# Patient Record
Sex: FEMALE | Race: OTHER | HISPANIC OR LATINO | URBAN - METROPOLITAN AREA
[De-identification: names, ages, dates, MRNs, and addresses within clinical notes are randomized per-mention and may not be internally consistent; named-entity substitution may affect disease eponyms.]

---

## 2017-01-05 ENCOUNTER — OUTPATIENT (OUTPATIENT)
Dept: OUTPATIENT SERVICES | Facility: HOSPITAL | Age: 27
LOS: 1 days | End: 2017-01-05
Payer: COMMERCIAL

## 2017-01-05 VITALS
SYSTOLIC BLOOD PRESSURE: 110 MMHG | TEMPERATURE: 98 F | DIASTOLIC BLOOD PRESSURE: 72 MMHG | HEIGHT: 64 IN | RESPIRATION RATE: 20 BRPM | HEART RATE: 68 BPM | WEIGHT: 127.87 LBS

## 2017-01-05 DIAGNOSIS — N87.9 DYSPLASIA OF CERVIX UTERI, UNSPECIFIED: ICD-10-CM

## 2017-01-05 DIAGNOSIS — Z01.818 ENCOUNTER FOR OTHER PREPROCEDURAL EXAMINATION: ICD-10-CM

## 2017-01-05 DIAGNOSIS — Z98.891 HISTORY OF UTERINE SCAR FROM PREVIOUS SURGERY: Chronic | ICD-10-CM

## 2017-01-05 DIAGNOSIS — I10 ESSENTIAL (PRIMARY) HYPERTENSION: ICD-10-CM

## 2017-01-05 LAB
ALBUMIN SERPL ELPH-MCNC: 4.4 G/DL — SIGNIFICANT CHANGE UP (ref 3.3–5.2)
ALP SERPL-CCNC: 88 U/L — SIGNIFICANT CHANGE UP (ref 40–120)
ALT FLD-CCNC: 18 U/L — SIGNIFICANT CHANGE UP
ANION GAP SERPL CALC-SCNC: 12 MMOL/L — SIGNIFICANT CHANGE UP (ref 5–17)
AST SERPL-CCNC: 24 U/L — SIGNIFICANT CHANGE UP
BILIRUB SERPL-MCNC: 0.5 MG/DL — SIGNIFICANT CHANGE UP (ref 0.4–2)
BUN SERPL-MCNC: 9 MG/DL — SIGNIFICANT CHANGE UP (ref 8–20)
CALCIUM SERPL-MCNC: 9.6 MG/DL — SIGNIFICANT CHANGE UP (ref 8.6–10.2)
CHLORIDE SERPL-SCNC: 101 MMOL/L — SIGNIFICANT CHANGE UP (ref 98–107)
CO2 SERPL-SCNC: 26 MMOL/L — SIGNIFICANT CHANGE UP (ref 22–29)
CREAT SERPL-MCNC: 0.53 MG/DL — SIGNIFICANT CHANGE UP (ref 0.5–1.3)
GLUCOSE SERPL-MCNC: 85 MG/DL — SIGNIFICANT CHANGE UP (ref 70–115)
HCT VFR BLD CALC: 41.8 % — SIGNIFICANT CHANGE UP (ref 37–47)
HGB BLD-MCNC: 14.2 G/DL — SIGNIFICANT CHANGE UP (ref 12–16)
MCHC RBC-ENTMCNC: 30.9 PG — SIGNIFICANT CHANGE UP (ref 27–31)
MCHC RBC-ENTMCNC: 34 G/DL — SIGNIFICANT CHANGE UP (ref 32–36)
MCV RBC AUTO: 90.9 FL — SIGNIFICANT CHANGE UP (ref 81–99)
PLATELET # BLD AUTO: 323 K/UL — SIGNIFICANT CHANGE UP (ref 150–400)
POTASSIUM SERPL-MCNC: 3.7 MMOL/L — SIGNIFICANT CHANGE UP (ref 3.5–5.3)
POTASSIUM SERPL-SCNC: 3.7 MMOL/L — SIGNIFICANT CHANGE UP (ref 3.5–5.3)
PROT SERPL-MCNC: 7.6 G/DL — SIGNIFICANT CHANGE UP (ref 6.6–8.7)
RBC # BLD: 4.6 M/UL — SIGNIFICANT CHANGE UP (ref 4.4–5.2)
RBC # FLD: 12.1 % — SIGNIFICANT CHANGE UP (ref 11–15.6)
SODIUM SERPL-SCNC: 139 MMOL/L — SIGNIFICANT CHANGE UP (ref 135–145)
WBC # BLD: 8.25 K/UL — SIGNIFICANT CHANGE UP (ref 4.8–10.8)
WBC # FLD AUTO: 8.25 K/UL — SIGNIFICANT CHANGE UP (ref 4.8–10.8)

## 2017-01-05 PROCEDURE — 93005 ELECTROCARDIOGRAM TRACING: CPT

## 2017-01-05 PROCEDURE — 80053 COMPREHEN METABOLIC PANEL: CPT

## 2017-01-05 PROCEDURE — T1013: CPT

## 2017-01-05 PROCEDURE — 85027 COMPLETE CBC AUTOMATED: CPT

## 2017-01-05 PROCEDURE — G0463: CPT

## 2017-01-05 PROCEDURE — 93010 ELECTROCARDIOGRAM REPORT: CPT

## 2017-01-05 NOTE — H&P PST ADULT - HISTORY OF PRESENT ILLNESS
This is a 26 y.o Slovak speaking female who presents to PST today.  The pt reports she had a routine PAP smear which demonstrated abnormal cells.  A subsequent biopsy demonstrated cancerous cells.  She is scheduled for LEEP procedure in the near future.

## 2017-01-05 NOTE — H&P PST ADULT - FAMILY HISTORY
Father  Still living? Yes, Estimated age: 41-50  Family history of diabetes mellitus, Age at diagnosis: Age Unknown  Family history of renal failure, Age at diagnosis: Age Unknown

## 2017-01-05 NOTE — H&P PST ADULT - NSANTHOSAYNRD_GEN_A_CORE
No. GISSELLE screening performed.  STOP BANG Legend: 0-2 = LOW Risk; 3-4 = INTERMEDIATE Risk; 5-8 = HIGH Risk

## 2017-01-06 DIAGNOSIS — Z01.818 ENCOUNTER FOR OTHER PREPROCEDURAL EXAMINATION: ICD-10-CM

## 2017-01-06 DIAGNOSIS — N87.9 DYSPLASIA OF CERVIX UTERI, UNSPECIFIED: ICD-10-CM

## 2017-01-19 ENCOUNTER — OUTPATIENT (OUTPATIENT)
Dept: OUTPATIENT SERVICES | Facility: HOSPITAL | Age: 27
LOS: 1 days | Discharge: HOME | End: 2017-01-19
Payer: COMMERCIAL

## 2017-01-19 ENCOUNTER — RESULT REVIEW (OUTPATIENT)
Age: 27
End: 2017-01-19

## 2017-01-19 VITALS
DIASTOLIC BLOOD PRESSURE: 78 MMHG | SYSTOLIC BLOOD PRESSURE: 120 MMHG | HEIGHT: 63.5 IN | OXYGEN SATURATION: 100 % | WEIGHT: 128.97 LBS | TEMPERATURE: 98 F | RESPIRATION RATE: 20 BRPM | HEART RATE: 76 BPM

## 2017-01-19 VITALS
SYSTOLIC BLOOD PRESSURE: 102 MMHG | DIASTOLIC BLOOD PRESSURE: 62 MMHG | OXYGEN SATURATION: 100 % | RESPIRATION RATE: 16 BRPM | TEMPERATURE: 99 F | HEART RATE: 60 BPM

## 2017-01-19 DIAGNOSIS — Z98.891 HISTORY OF UTERINE SCAR FROM PREVIOUS SURGERY: Chronic | ICD-10-CM

## 2017-01-19 DIAGNOSIS — N87.9 DYSPLASIA OF CERVIX UTERI, UNSPECIFIED: ICD-10-CM

## 2017-01-19 RX ORDER — CEFAZOLIN SODIUM 1 G
1000 VIAL (EA) INJECTION ONCE
Qty: 0 | Refills: 0 | Status: DISCONTINUED | OUTPATIENT
Start: 2017-01-19 | End: 2017-01-19

## 2017-01-19 RX ORDER — CEFAZOLIN SODIUM 1 G
2000 VIAL (EA) INJECTION ONCE
Qty: 0 | Refills: 0 | Status: DISCONTINUED | OUTPATIENT
Start: 2017-01-19 | End: 2017-01-19

## 2017-01-19 RX ORDER — ONDANSETRON 8 MG/1
4 TABLET, FILM COATED ORAL ONCE
Qty: 0 | Refills: 0 | Status: DISCONTINUED | OUTPATIENT
Start: 2017-01-19 | End: 2017-01-19

## 2017-01-19 RX ORDER — LABETALOL HCL 100 MG
1 TABLET ORAL
Qty: 0 | Refills: 0 | COMMUNITY

## 2017-01-19 RX ORDER — SODIUM CHLORIDE 9 MG/ML
3 INJECTION INTRAMUSCULAR; INTRAVENOUS; SUBCUTANEOUS ONCE
Qty: 0 | Refills: 0 | Status: DISCONTINUED | OUTPATIENT
Start: 2017-01-19 | End: 2017-01-19

## 2017-01-19 RX ORDER — SODIUM CHLORIDE 9 MG/ML
1000 INJECTION, SOLUTION INTRAVENOUS
Qty: 0 | Refills: 0 | Status: DISCONTINUED | OUTPATIENT
Start: 2017-01-19 | End: 2017-01-19

## 2017-01-19 RX ORDER — HYDROMORPHONE HYDROCHLORIDE 2 MG/ML
0.5 INJECTION INTRAMUSCULAR; INTRAVENOUS; SUBCUTANEOUS
Qty: 0 | Refills: 0 | Status: DISCONTINUED | OUTPATIENT
Start: 2017-01-19 | End: 2017-01-19

## 2017-01-20 ENCOUNTER — TRANSCRIPTION ENCOUNTER (OUTPATIENT)
Age: 27
End: 2017-01-20

## 2017-01-20 PROCEDURE — 88307 TISSUE EXAM BY PATHOLOGIST: CPT | Mod: 26

## 2017-01-20 PROCEDURE — 88307 TISSUE EXAM BY PATHOLOGIST: CPT

## 2017-01-20 PROCEDURE — 57522 CONIZATION OF CERVIX: CPT

## 2017-01-20 PROCEDURE — T1013: CPT

## 2017-01-23 LAB — SURGICAL PATHOLOGY FINAL REPORT - CH: SIGNIFICANT CHANGE UP

## 2017-02-02 NOTE — ASU PATIENT PROFILE, ADULT - NSALCOHOLPROBLEMSRELYN_GEN_A_CORE_SD
Spoke with nurse at [address] who stated patient was refusing to take her Vimpat. Nurse stated she already spoke with nursing home attending physician.  Informed nurse that patient has only been seen in the hospital.    Attempted to transfer c
no

## 2018-01-31 ENCOUNTER — EMERGENCY (EMERGENCY)
Facility: HOSPITAL | Age: 28
LOS: 1 days | Discharge: HOME | End: 2018-01-31
Attending: EMERGENCY MEDICINE
Payer: COMMERCIAL

## 2018-01-31 VITALS — SYSTOLIC BLOOD PRESSURE: 186 MMHG | HEART RATE: 68 BPM | OXYGEN SATURATION: 100 % | DIASTOLIC BLOOD PRESSURE: 119 MMHG

## 2018-01-31 VITALS
HEART RATE: 77 BPM | TEMPERATURE: 99 F | OXYGEN SATURATION: 99 % | DIASTOLIC BLOOD PRESSURE: 129 MMHG | WEIGHT: 128.09 LBS | SYSTOLIC BLOOD PRESSURE: 180 MMHG | HEIGHT: 64 IN | RESPIRATION RATE: 16 BRPM

## 2018-01-31 DIAGNOSIS — Z98.891 HISTORY OF UTERINE SCAR FROM PREVIOUS SURGERY: Chronic | ICD-10-CM

## 2018-01-31 PROBLEM — I10 ESSENTIAL (PRIMARY) HYPERTENSION: Chronic | Status: ACTIVE | Noted: 2017-01-05

## 2018-01-31 PROCEDURE — 99282 EMERGENCY DEPT VISIT SF MDM: CPT

## 2018-01-31 PROCEDURE — 99284 EMERGENCY DEPT VISIT MOD MDM: CPT

## 2018-01-31 RX ORDER — LABETALOL HCL 100 MG
1 TABLET ORAL
Qty: 60 | Refills: 0 | OUTPATIENT
Start: 2018-01-31 | End: 2018-03-01

## 2018-01-31 RX ORDER — LOSARTAN/HYDROCHLOROTHIAZIDE 100MG-25MG
1 TABLET ORAL
Qty: 30 | Refills: 0 | OUTPATIENT
Start: 2018-01-31 | End: 2018-03-01

## 2018-01-31 RX ORDER — AMLODIPINE BESYLATE 2.5 MG/1
1 TABLET ORAL
Qty: 30 | Refills: 0 | OUTPATIENT
Start: 2018-01-31 | End: 2018-03-01

## 2018-01-31 NOTE — ED PROVIDER NOTE - TEMPLATE
Peripheral Block    Patient location during procedure: holding area  Start time: 10/27/2017 11:00 AM  Reason for block: post-op pain management  Staffing  Anesthesiologist: GOLD TONG  Preanesthetic Checklist  Completed: patient identified, site marked, pre-op evaluation, timeout performed, IV checked, risks and benefits discussed and monitors and equipment checked  Peripheral Block  Patient position: supine  Prep: ChloraPrep  Patient monitoring: cardiac monitor and continuous pulse ox  Block type: adductor canal block  Laterality: right  Injection technique: single-shot  Procedures: ultrasound guided  Local infiltration: ropivacaine  Infiltration strength: 0 5 %  Dose: 15 mL  Needle  Needle type: [facility]   Needle gauge: 22 G  Needle length: 10 cm  Needle localization: ultrasound guidance  Needle insertion depth: 5 cm  Test dose: negative  Assessment  Injection assessment: incremental injection, local visualized surrounding nerve on ultrasound and negative aspiration for heme  Paresthesia pain: none  Heart rate change: no  Slow fractionated injection: yes  Post-procedure:  site cleaned
Peripheral Block    Patient location during procedure: holding area  Start time: 10/27/2017 11:10 AM  Reason for block: post-op pain management  Staffing  Anesthesiologist: Ramses Felipe  Performed: anesthesiologist   Preanesthetic Checklist  Completed: patient identified, site marked, surgical consent, pre-op evaluation, timeout performed, IV checked, risks and benefits discussed and monitors and equipment checked  Peripheral Block  Patient position: supine  Prep: ChloraPrep  Patient monitoring: continuous pulse ox and frequent blood pressure checks  Block type: popliteal  Laterality: right  Injection technique: single-shot  Procedures: ultrasound guided  Local infiltration: ropivacaine  Infiltration strength: 0 5 %  Dose: 15 mL  Needle  Needle type: Stimuplex   Needle gauge: 22 G  Needle length: 10 cm  Needle localization: ultrasound guidance  Needle insertion depth: 6 cm  Test dose: negative  Assessment  Injection assessment: incremental injection  Heart rate change: no  Slow fractionated injection: yes  Post-procedure:  site cleaned  patient tolerated the procedure well with no immediate complications
General

## 2018-01-31 NOTE — ED ADULT TRIAGE NOTE - CHIEF COMPLAINT QUOTE
pt was seen at [facility] for urinary burning, states has hx of high blood pressure and hasn't been taking it, was sent here for evaluation, pt is asymptomatic

## 2018-01-31 NOTE — ED PROVIDER NOTE - OBJECTIVE STATEMENT
This is a 27 year old female with c/o elevated BP, she reports is chronically high and takes three different medications to control her BP, however she ran out of medication x 1 month.  She reports went to PCP [facility] and was referred to ED because BP was elevated, and was given three different medications.  Patient is unaware of medications given.  She denies any headache or chest pain, SOB, abdominal pain, n/v/d.  Patient reports visit prompted from PCP secondary to dysuria.  Patient received Keflex for UTI coverage.

## 2018-01-31 NOTE — ED PROVIDER NOTE - ATTENDING CONTRIBUTION TO CARE
Seen with acp  here for asymptomatic hypertension  PE is urenarkable  will renew patient's  bp meds  Agree with ACPs assessment, hx and physical

## 2018-06-12 ENCOUNTER — APPOINTMENT (OUTPATIENT)
Dept: ORTHOPEDIC SURGERY | Facility: CLINIC | Age: 28
End: 2018-06-12
Payer: COMMERCIAL

## 2018-06-12 VITALS
WEIGHT: 128 LBS | HEIGHT: 65 IN | HEART RATE: 61 BPM | BODY MASS INDEX: 21.33 KG/M2 | SYSTOLIC BLOOD PRESSURE: 153 MMHG | DIASTOLIC BLOOD PRESSURE: 98 MMHG

## 2018-06-12 DIAGNOSIS — M51.36 OTHER INTERVERTEBRAL DISC DEGENERATION, LUMBAR REGION: ICD-10-CM

## 2018-06-12 DIAGNOSIS — M53.86 OTHER SPECIFIED DORSOPATHIES, LUMBAR REGION: ICD-10-CM

## 2018-06-12 PROCEDURE — 72100 X-RAY EXAM L-S SPINE 2/3 VWS: CPT

## 2018-06-12 PROCEDURE — 99204 OFFICE O/P NEW MOD 45 MIN: CPT

## 2018-09-07 ENCOUNTER — MESSAGE (OUTPATIENT)
Age: 28
End: 2018-09-07

## 2021-08-23 ENCOUNTER — EMERGENCY (EMERGENCY)
Facility: HOSPITAL | Age: 31
LOS: 1 days | Discharge: HOME | End: 2021-08-23
Attending: EMERGENCY MEDICINE
Payer: COMMERCIAL

## 2021-08-23 VITALS
HEIGHT: 64 IN | RESPIRATION RATE: 18 BRPM | SYSTOLIC BLOOD PRESSURE: 220 MMHG | HEART RATE: 64 BPM | TEMPERATURE: 98 F | DIASTOLIC BLOOD PRESSURE: 140 MMHG | OXYGEN SATURATION: 100 %

## 2021-08-23 VITALS
SYSTOLIC BLOOD PRESSURE: 178 MMHG | OXYGEN SATURATION: 98 % | RESPIRATION RATE: 18 BRPM | HEART RATE: 82 BPM | DIASTOLIC BLOOD PRESSURE: 87 MMHG

## 2021-08-23 DIAGNOSIS — Z98.891 HISTORY OF UTERINE SCAR FROM PREVIOUS SURGERY: Chronic | ICD-10-CM

## 2021-08-23 LAB
ALBUMIN SERPL ELPH-MCNC: 4.1 G/DL — SIGNIFICANT CHANGE UP (ref 3.3–5.2)
ALP SERPL-CCNC: 80 U/L — SIGNIFICANT CHANGE UP (ref 40–120)
ALT FLD-CCNC: 24 U/L — SIGNIFICANT CHANGE UP
ANION GAP SERPL CALC-SCNC: 13 MMOL/L — SIGNIFICANT CHANGE UP (ref 5–17)
AST SERPL-CCNC: 23 U/L — SIGNIFICANT CHANGE UP
BASOPHILS # BLD AUTO: 0.08 K/UL — SIGNIFICANT CHANGE UP (ref 0–0.2)
BASOPHILS NFR BLD AUTO: 1.3 % — SIGNIFICANT CHANGE UP (ref 0–2)
BILIRUB SERPL-MCNC: 0.2 MG/DL — LOW (ref 0.4–2)
BUN SERPL-MCNC: 11.3 MG/DL — SIGNIFICANT CHANGE UP (ref 8–20)
CALCIUM SERPL-MCNC: 9.3 MG/DL — SIGNIFICANT CHANGE UP (ref 8.6–10.2)
CHLORIDE SERPL-SCNC: 102 MMOL/L — SIGNIFICANT CHANGE UP (ref 98–107)
CO2 SERPL-SCNC: 24 MMOL/L — SIGNIFICANT CHANGE UP (ref 22–29)
CREAT SERPL-MCNC: 0.65 MG/DL — SIGNIFICANT CHANGE UP (ref 0.5–1.3)
EOSINOPHIL # BLD AUTO: 0.22 K/UL — SIGNIFICANT CHANGE UP (ref 0–0.5)
EOSINOPHIL NFR BLD AUTO: 3.5 % — SIGNIFICANT CHANGE UP (ref 0–6)
GLUCOSE SERPL-MCNC: 81 MG/DL — SIGNIFICANT CHANGE UP (ref 70–99)
HCT VFR BLD CALC: 44.2 % — SIGNIFICANT CHANGE UP (ref 34.5–45)
HGB BLD-MCNC: 14.5 G/DL — SIGNIFICANT CHANGE UP (ref 11.5–15.5)
IMM GRANULOCYTES NFR BLD AUTO: 0.3 % — SIGNIFICANT CHANGE UP (ref 0–1.5)
LYMPHOCYTES # BLD AUTO: 2.21 K/UL — SIGNIFICANT CHANGE UP (ref 1–3.3)
LYMPHOCYTES # BLD AUTO: 35 % — SIGNIFICANT CHANGE UP (ref 13–44)
MAGNESIUM SERPL-MCNC: 2.1 MG/DL — SIGNIFICANT CHANGE UP (ref 1.6–2.6)
MCHC RBC-ENTMCNC: 30.2 PG — SIGNIFICANT CHANGE UP (ref 27–34)
MCHC RBC-ENTMCNC: 32.8 GM/DL — SIGNIFICANT CHANGE UP (ref 32–36)
MCV RBC AUTO: 92.1 FL — SIGNIFICANT CHANGE UP (ref 80–100)
MONOCYTES # BLD AUTO: 0.4 K/UL — SIGNIFICANT CHANGE UP (ref 0–0.9)
MONOCYTES NFR BLD AUTO: 6.3 % — SIGNIFICANT CHANGE UP (ref 2–14)
NEUTROPHILS # BLD AUTO: 3.39 K/UL — SIGNIFICANT CHANGE UP (ref 1.8–7.4)
NEUTROPHILS NFR BLD AUTO: 53.6 % — SIGNIFICANT CHANGE UP (ref 43–77)
NT-PROBNP SERPL-SCNC: 121 PG/ML — SIGNIFICANT CHANGE UP (ref 0–300)
PLATELET # BLD AUTO: 317 K/UL — SIGNIFICANT CHANGE UP (ref 150–400)
POTASSIUM SERPL-MCNC: 3.5 MMOL/L — SIGNIFICANT CHANGE UP (ref 3.5–5.3)
POTASSIUM SERPL-SCNC: 3.5 MMOL/L — SIGNIFICANT CHANGE UP (ref 3.5–5.3)
PROT SERPL-MCNC: 7.5 G/DL — SIGNIFICANT CHANGE UP (ref 6.6–8.7)
RBC # BLD: 4.8 M/UL — SIGNIFICANT CHANGE UP (ref 3.8–5.2)
RBC # FLD: 12.8 % — SIGNIFICANT CHANGE UP (ref 10.3–14.5)
SARS-COV-2 RNA SPEC QL NAA+PROBE: SIGNIFICANT CHANGE UP
SODIUM SERPL-SCNC: 139 MMOL/L — SIGNIFICANT CHANGE UP (ref 135–145)
TROPONIN T SERPL-MCNC: <0.01 NG/ML — SIGNIFICANT CHANGE UP (ref 0–0.06)
WBC # BLD: 6.32 K/UL — SIGNIFICANT CHANGE UP (ref 3.8–10.5)
WBC # FLD AUTO: 6.32 K/UL — SIGNIFICANT CHANGE UP (ref 3.8–10.5)

## 2021-08-23 PROCEDURE — 93010 ELECTROCARDIOGRAM REPORT: CPT

## 2021-08-23 PROCEDURE — 71045 X-RAY EXAM CHEST 1 VIEW: CPT

## 2021-08-23 PROCEDURE — 99284 EMERGENCY DEPT VISIT MOD MDM: CPT | Mod: 25

## 2021-08-23 PROCEDURE — 71045 X-RAY EXAM CHEST 1 VIEW: CPT | Mod: 26

## 2021-08-23 PROCEDURE — U0003: CPT

## 2021-08-23 PROCEDURE — T1013: CPT

## 2021-08-23 PROCEDURE — 93005 ELECTROCARDIOGRAM TRACING: CPT

## 2021-08-23 PROCEDURE — 83880 ASSAY OF NATRIURETIC PEPTIDE: CPT

## 2021-08-23 PROCEDURE — 84702 CHORIONIC GONADOTROPIN TEST: CPT

## 2021-08-23 PROCEDURE — 85025 COMPLETE CBC W/AUTO DIFF WBC: CPT

## 2021-08-23 PROCEDURE — U0005: CPT

## 2021-08-23 PROCEDURE — 99285 EMERGENCY DEPT VISIT HI MDM: CPT

## 2021-08-23 PROCEDURE — 83735 ASSAY OF MAGNESIUM: CPT

## 2021-08-23 PROCEDURE — 84484 ASSAY OF TROPONIN QUANT: CPT

## 2021-08-23 PROCEDURE — 80053 COMPREHEN METABOLIC PANEL: CPT

## 2021-08-23 PROCEDURE — 36415 COLL VENOUS BLD VENIPUNCTURE: CPT

## 2021-08-23 RX ORDER — AMLODIPINE BESYLATE 2.5 MG/1
1 TABLET ORAL
Qty: 21 | Refills: 0
Start: 2021-08-23 | End: 2021-09-12

## 2021-08-23 RX ORDER — AMLODIPINE BESYLATE 2.5 MG/1
10 TABLET ORAL ONCE
Refills: 0 | Status: COMPLETED | OUTPATIENT
Start: 2021-08-23 | End: 2021-08-23

## 2021-08-23 RX ADMIN — AMLODIPINE BESYLATE 10 MILLIGRAM(S): 2.5 TABLET ORAL at 14:45

## 2021-08-23 NOTE — ED PROVIDER NOTE - ATTENDING CONTRIBUTION TO CARE
The patient seen and examined    Asymptomatic HTN    I, Ernie Penn, performed the initial face to face bedside interview with this patient regarding history of present illness, review of symptoms and relevant past medical, social and family history.  I completed an independent physical examination.  I was the initial provider who evaluated this patient. I have signed out the follow up of any pending tests (i.e. labs, radiological studies) to the resident.  I have communicated the patient’s plan of care and disposition with the resident

## 2021-08-23 NOTE — ED ADULT NURSE NOTE - SUICIDE SCREENING QUESTION 3
----- Message from Paris Jean MD sent at 1/3/2019  8:28 AM CST -----  Please advise pt that I have reviewed u/s however to follow up still with gyn as scheduled to discuss options and endometrial biopsy. Her u/s did show a simple cyst of left ovary and a benign appearing tumor (fibroid) which could also be an explanation for her irregular bleeding. Typically these are not concerning if they are small and pt is asymptomatic. Further recommendations per gyn. Appreciate their input.   No

## 2021-08-23 NOTE — ED PROVIDER NOTE - CARE PROVIDER_API CALL
Ewelina Funes)  Family Medicine  [address] [place] [address]  Phone: [phone]  Fax: [phone]  Follow Up Time:

## 2021-08-23 NOTE — ED ADULT NURSE NOTE - OBJECTIVE STATEMENT
Received pt. a&ox3 RR even and unlabored. Denies any pain, dizziness or blurred vision. Pt. medicated for for bp. Pt educated on plan of care, pt able to successfully teach back plan of care to RN, RN will continue to reeducate pt during hospital stay.

## 2021-08-23 NOTE — ED ADULT NURSE NOTE - CHIEF COMPLAINT QUOTE
Pt comes from doctors office, c/o high blood pressure, hx of HTN, has not been on medication since before covid, also c/o fever and cough since Friday

## 2021-08-23 NOTE — ED PROVIDER NOTE - PHYSICAL EXAMINATION
Constitutional: Awake, alert, in no acute distress  Eyes: no scleral icterus, PERRL  HENT: normocephalic, atraumatic, moist oral mucosa  Neck: supple  CV: RRR, no murmur  Pulm: non-labored respirations, CTAB  Abdomen: soft, non-tender, non-distended  Extremities: no edema, no deformity  Skin: no rash, no jaundice  Neuro: AAOx3, CNs II-XII intact, no facial asymmetry, 5/5 strength and sensation in all extremities, no finger-to-nose dysmetria

## 2021-08-23 NOTE — ED PROVIDER NOTE - CLINICAL SUMMARY MEDICAL DECISION MAKING FREE TEXT BOX
31y F w/ hx HTN, noncompliant with meds, now presenting for elevated BP found incidentally at PCP visit.  Pt well appearing, in no distress.  Will give amlodipine, check labs, EKG and CXR, reassess.

## 2021-08-23 NOTE — ED PROVIDER NOTE - OBJECTIVE STATEMENT
31y F w/ hx HTN, presenting for elevated BP.  Pt reports that she has prior hx of HTN but has been noncompliant with meds for the past year.  Saw her PCP today for URI symptoms of fever and cough since 3 days ago.  Was noted to have elevated BP to 220 systolic, and was sent to the ED for further evaluation.  Complains of mild headache; otherwise denies vision changes, chest pain, SOB or other complaints.  Has not been vaccinated for COVID.

## 2021-08-23 NOTE — ED PROVIDER NOTE - PROGRESS NOTE DETAILS
Adi: Pt with rpt BP of 190/98.  Will send Rx for amlodipine.  Stable for discharge with outpatient f/u. Adi: Pt with rpt BP of 178/87.  Will send Rx for amlodipine.  Stable for discharge with outpatient f/u.

## 2021-08-23 NOTE — ED PROVIDER NOTE - PATIENT PORTAL LINK FT
You can access the FollowMyHealth Patient Portal offered by [facility] by registering at the following website: [email or web address]. By joining [facility]’s FollowMyHealth portal, you will also be able to view your health information using other applications (apps) compatible with our system.

## 2021-08-23 NOTE — ED PROVIDER NOTE - NSFOLLOWUPINSTRUCTIONS_ED_ALL_ED_FT
Hipertensión en los adultos    Hypertension, Adult      La presión arterial tima (hipertensión) se produce cuando la fuerza de la racquel bombea a través de las arterias con mucha fuerza. Las arterias son los vasos sanguíneos que transportan la racquel desde el corazón al walker del cuerpo. La hipertensión hace que el corazón shelly más esfuerzo para bombear racquel y puede provocar que las arterias se estrechen o endurezcan. La hipertensión no tratada o no controlada puede causar infarto de miocardio, insuficiencia cardíaca, accidente cerebrovascular, enfermedad renal y otros problemas.    Christine lectura de la presión arterial consta de un número más alto sobre un número más bajo. En condiciones ideales, la presión arterial debe estar por debajo de 120/80. El primer número (“superior”) es la presión sistólica. Es la medida de la presión de las arterias cuando el corazón late. El eddie número (“inferior”) es la presión diastólica. Es la medida de la presión en las arterias cuando el corazón se relaja.      ¿Cuáles son las causas?    Se desconoce la causa exacta de esta afección. Hay algunas afecciones que causan presión arterial tima o están relacionadas con ethan.      ¿Qué incrementa el riesgo?  Algunos factores de riesgo de hipertensión están bajo rosenberg control. Los siguientes factores pueden hacer que sea más propenso a desarrollar esta afección:  •Fumar.      •Tener diabetes mellitus tipo 2, colesterol alto, o ambos.      •No hacer la cantidad suficiente de actividad física o ejercicio.      •Tener sobrepeso.      •Consumir mucha grasa, azúcar, calorías o sal (sodio) en rosenberg dieta.      •Beber alcohol en exceso.    Algunos factores de riesgo para la presión arterial tima pueden ser difíciles o imposibles de cambiar. Algunos de estos factores son los siguientes:  •Tener enfermedad renal crónica.      •Tener antecedentes familiares de presión arterial tima.      •Edad. Los riesgos aumentan con la edad.      •Kirit. El riesgo es mayor para las personas afroamericanas.      •Sexo. Antes de los 45 años, los hombres corren más riesgo que las mujeres. Después de los 65 años, las mujeres corren más riesgo que los hombres.      •Tener apnea obstructiva del sueño.      •Estrés.        ¿Cuáles son los signos o los síntomas?  Es posible que la presión arterial tima puede no cause síntomas. La presión arterial muy tima (crisis hipertensiva) puede provocar:  •Dolor de waldemar.      •Ansiedad.      •Falta de aire.      •Hemorragia nasal.      •Náuseas y vómitos.      •Cambios en la visión.      •Dolor de pecho intenso.      •Convulsiones.        ¿Cómo se diagnostica?    Esta afección se diagnostica al medir rosenberg presión arterial mientras se encuentra sentado, con el brazo apoyado sobre christine superficie plana, las piernas sin cruzar y los pies savi apoyados en el piso. El brazalete del tensiómetro debe colocarse directamente sobre la piel de la parte superior del brazo y al nivel de rosenberg corazón. Debe medirla al menos dos veces en el mismo brazo. Determinadas condiciones pueden causar christnie diferencia de presión arterial entre el brazo deirdre y el derecho.  Ciertos factores pueden provocar que las lecturas de la presión arterial eliz inferiores o superiores a lo normal por un período corto de tiempo:  •Si rosenberg presión arterial es más tima cuando se encuentra en el consultorio del médico que cuando la mide en rosenberg hogar, se denomina “hipertensión de bata lani”. La mayoría de las personas que tienen esta afección no deben ser medicadas.      •Si rosenberg presión arterial es más tima en el hogar que cuando se encuentra en el consultorio del médico, se denomina “hipertensión enmascarada”. La mayoría de las personas que tienen esta afección deben ser medicadas para controlar la presión arterial.    Si tiene christine lecturas de presión arterial tima gretchen christine visita o si tiene presión arterial normal con otros factores de riesgo, se le podrá pedir que shelly lo siguiente:  •Que regrese otro día para volver a controlar rosenberg presión arterial nuevamente.      •Que se controle la presión arterial en rosenberg casa gretchen 1 semana o más.      Si se le diagnostica hipertensión, es posible que se le realicen otros análisis de racquel o estudios de diagnóstico por imágenes para ayudar a rosenberg médico a comprender rosenberg riesgo general de tener otras afecciones.      ¿Cómo se trata?  Esta afección se trata haciendo cambios saludables en el estilo de keagan, tales crow ingerir alimentos saludables, realizar más ejercicio y reducir el consumo de alcohol. El médico puede recetarle medicamentos si los cambios en el estilo de keagan no son suficientes para lograr controlar la presión arterial y si:  •Rosenberg presión arterial sistólica está por encima de 130.      •Rosenberg presión arterial diastólica está por encima de 80.      La presión arterial deseada puede variar en función de las enfermedades, la edad y otros factores personales.      Siga estas instrucciones en rosenberg casa:      Comida y bebida    •Siga christine dieta con alto contenido de fibras y potasio, y con bajo contenido de sodio, azúcar agregada y grasas. Un ejemplo de plan alimenticio es la dieta DASH (Dietary Approaches to Stop Hypertension, Métodos alimenticios para detener la hipertensión). Para alimentarse de esta manera:  •Coma mucha fruta y verdura fresca. Trate de que la mitad del plato de cada comida sea de frutas y verduras.      •Coma cereales integrales, crow pasta integral, arroz integral o pan integral. Llene aproximadamente un cuarto del plato con cereales integrales.      •Coma y randi productos lácteos con bajo contenido de grasa, crow leche descremada o yogur bajo en grasas.      •Evite la ingesta de [place] de carne grasa, carne procesada o curada, y carne de ave con piel. Llene aproximadamente un cuarto del plato con proteínas magras, crow pescado, kenneth sin piel, frijoles, huevos o tofu.      •Evite ingerir alimentos prehechos y procesados. En general, estos tienen mayor cantidad de sodio, azúcar agregada y grasa.        •Reduzca rosenberg ingesta diaria de sodio. La mayoría de las personas que tienen hipertensión deben comer menos de 1500 mg de sodio por día.    • No randi alcohol si:  •Rosenberg médico le indica no hacerlo.      •Está embarazada, puede estar embarazada o está tratando de quedar embarazada.      •Si sreekanth alcohol:•Limite la cantidad que sreekanth a lo siguiente:  •De 0 a 1 medida por día para las mujeres.      •De 0 a 2 medidas por día para los hombres.        •Esté atento a la cantidad de alcohol que hay en las bebidas que olivier. En los [place], christine medida equivale a christine botella de cerveza de 12 oz (355 ml), un vaso de vino de 5 oz (148 ml) o un vaso de christine bebida alcohólica de tima graduación de 1½ oz (44 ml).          Estilo de keagan      •Trabaje con rosenberg médico para mantener un peso saludable o perder peso. Pregúntele cuál es el peso recomendado para usted.      •Shelly al menos 30 minutos de ejercicio la mayoría de los días de la semana. Estas actividades pueden incluir caminar, nadar o andar en bicicleta.      •Incluya ejercicios para fortalecer annabella músculos (ejercicios de resistencia), crow [place] o levantamiento de pesas, crow parte de rosenberg rutina semanal de ejercicios. Intente realizar 30 minutos de christiane tipo de ejercicios al menos brenna días a la semana.      • No consuma ningún producto que contenga nicotina o tabaco, crow cigarrillos, cigarrillos electrónicos y tabaco de mascar. Si necesita ayuda para dejar de fumar, consulte al médico.      •Contrólese la presión arterial en rosenberg casa según las indicaciones del médico.      •Concurra a todas las visitas de seguimiento crow se lo haya indicado el médico. Parc es importante.      Medicamentos     •[place] los medicamentos de venta thelma y los recetados solamente crow se lo haya indicado el médico. Siga cuidadosamente las indicaciones. Los medicamentos para la presión arterial deben tomarse según las indicaciones.      • No omita las dosis de medicamentos para la presión arterial. Si lo hace, estará en riesgo de tener problemas y puede hacer que los medicamentos eliz menos eficaces.      •Pregúntele a rosenberg médico a qué efectos secundarios o reacciones a los medicamentos debe prestar atención.        Comuníquese con un médico si:    •Piensa que tiene christine reacción a un medicamento que está tomando.      •Tiene livan de waldemar frecuentes (recurrentes).      •Se siente mareado.      •Tiene hinchazón en los tobillos.      •Tiene problemas de visión.        Solicite ayuda inmediatamente si:    •Siente un dolor de waldemar intenso o confusión.      •Siente debilidad inusual o adormecimiento.      •Siente que va a desmayarse.      •Siente un dolor intenso en el pecho o el abdomen.      •Vomita repetidas veces.      •Tiene dificultad para respirar.        Resumen    •La hipertensión se produce cuando la racquel bombea en las arterias con mucha fuerza. Si esta afección no se controla, podría correr riesgo de tener complicaciones graves.      •La presión arterial deseada puede variar en función de las enfermedades, la edad y otros factores personales. Para la mayoría de las personas, christine presión arterial normal es mando que 120/80.      •La hipertensión se trata con cambios en el estilo de keagan, medicamentos o christine combinación de ambos. Los cambios en el estilo de keagan incluyen pérdida de peso, ingerir alimentos sanos, seguir christine dieta baja en sodio, hacer más ejercicio y limitar el consumo de alcohol.      Esta información no tiene crow fin reemplazar el consejo del médico. Asegúrese de hacerle al médico cualquier pregunta que tenga.

## 2021-08-23 NOTE — ED ADULT NURSE NOTE - NSICDXFAMILYHX_GEN_ALL_CORE_FT
FAMILY HISTORY:  Father  Still living? Yes, Estimated age: 41-50  Family history of diabetes mellitus, Age at diagnosis: Age Unknown  Family history of renal failure, Age at diagnosis: Age Unknown

## 2021-09-10 ENCOUNTER — EMERGENCY (EMERGENCY)
Facility: HOSPITAL | Age: 31
LOS: 1 days | Discharge: HOME | End: 2021-09-10
Attending: EMERGENCY MEDICINE
Payer: COMMERCIAL

## 2021-09-10 VITALS
DIASTOLIC BLOOD PRESSURE: 113 MMHG | RESPIRATION RATE: 18 BRPM | TEMPERATURE: 99 F | SYSTOLIC BLOOD PRESSURE: 175 MMHG | OXYGEN SATURATION: 95 % | WEIGHT: 138.89 LBS | HEART RATE: 83 BPM | HEIGHT: 65 IN

## 2021-09-10 DIAGNOSIS — Z98.891 HISTORY OF UTERINE SCAR FROM PREVIOUS SURGERY: Chronic | ICD-10-CM

## 2021-09-10 LAB
APPEARANCE UR: ABNORMAL
BACTERIA # UR AUTO: ABNORMAL
BILIRUB UR-MCNC: ABNORMAL
COLOR SPEC: ABNORMAL
COMMENT - URINE: SIGNIFICANT CHANGE UP
DIFF PNL FLD: ABNORMAL
EPI CELLS # UR: SIGNIFICANT CHANGE UP
GLUCOSE UR QL: NEGATIVE — SIGNIFICANT CHANGE UP
HCG UR QL: NEGATIVE — SIGNIFICANT CHANGE UP
KETONES UR-MCNC: NEGATIVE — SIGNIFICANT CHANGE UP
LEUKOCYTE ESTERASE UR-ACNC: ABNORMAL
NITRITE UR-MCNC: POSITIVE
PH UR: 7 — SIGNIFICANT CHANGE UP (ref 5–8)
PROT UR-MCNC: 500 MG/DL
RBC CASTS # UR COMP ASSIST: ABNORMAL /HPF (ref 0–4)
SP GR SPEC: 1.01 — SIGNIFICANT CHANGE UP (ref 1.01–1.02)
UROBILINOGEN FLD QL: 8
WBC UR QL: ABNORMAL

## 2021-09-10 PROCEDURE — 81025 URINE PREGNANCY TEST: CPT

## 2021-09-10 PROCEDURE — 99283 EMERGENCY DEPT VISIT LOW MDM: CPT

## 2021-09-10 PROCEDURE — 87086 URINE CULTURE/COLONY COUNT: CPT

## 2021-09-10 PROCEDURE — 81001 URINALYSIS AUTO W/SCOPE: CPT

## 2021-09-10 PROCEDURE — 99284 EMERGENCY DEPT VISIT MOD MDM: CPT

## 2021-09-10 RX ORDER — NITROFURANTOIN MACROCRYSTAL 50 MG
100 CAPSULE ORAL ONCE
Refills: 0 | Status: DISCONTINUED | OUTPATIENT
Start: 2021-09-10 | End: 2021-09-10

## 2021-09-10 RX ORDER — ACETAMINOPHEN 500 MG
650 TABLET ORAL ONCE
Refills: 0 | Status: COMPLETED | OUTPATIENT
Start: 2021-09-10 | End: 2021-09-10

## 2021-09-10 RX ORDER — CEPHALEXIN 500 MG
500 CAPSULE ORAL ONCE
Refills: 0 | Status: COMPLETED | OUTPATIENT
Start: 2021-09-10 | End: 2021-09-10

## 2021-09-10 RX ORDER — PHENAZOPYRIDINE HCL 100 MG
200 TABLET ORAL ONCE
Refills: 0 | Status: COMPLETED | OUTPATIENT
Start: 2021-09-10 | End: 2021-09-10

## 2021-09-10 RX ADMIN — Medication 500 MILLIGRAM(S): at 03:12

## 2021-09-10 RX ADMIN — Medication 200 MILLIGRAM(S): at 03:12

## 2021-09-10 RX ADMIN — Medication 650 MILLIGRAM(S): at 03:11

## 2021-09-10 NOTE — ED PROVIDER NOTE - PATIENT PORTAL LINK FT
You can access the FollowMyHealth Patient Portal offered by [facility] by registering at the following website: [email or web address]. By joining [employer]’s FollowMyHealth portal, you will also be able to view your health information using other applications (apps) compatible with our system.

## 2021-09-10 NOTE — ED PROVIDER NOTE - CLINICAL SUMMARY MEDICAL DECISION MAKING FREE TEXT BOX
pt presenting with suprapubic pain, dysuria, urianlysis positive for uti, will send over keflex and pyridium, f/u with pcp

## 2021-09-10 NOTE — ED PROVIDER NOTE - OBJECTIVE STATEMENT
30 y/o female with hx of uti presents to the ED c/o dysuria, abdominal pain and back pain. Pt notes symptoms began yesterday. Feels similar to uti in the past. Burning with urination, suprapubic tenderness, with mild back pain, no fevers, no chills, no nausea or vomiting.

## 2021-09-10 NOTE — ED PROVIDER NOTE - ATTENDING CONTRIBUTION TO CARE
30 yo F presents ot ED c/o urinary dysuria with frequency with assoc suprapubics and low back pain x last 2 days.  no assoc fever or vomiting.  On exam, awake and alert in NAD, mm moist, Abd soft, NT, no CVAT.  UA as noted.  Rx Keflex and Pyridium with f/u as outpt

## 2021-09-10 NOTE — ED PROVIDER NOTE - NSFOLLOWUPINSTRUCTIONS_ED_ALL_ED_FT
1) Shelly un seguimiento con rosenberg médico de atención primaria en los próximos 5-7 días. Llame mañana para concertar christine dinesh. Si no puede hacer un seguimiento con rosenberg médico de atención primaria, regrese al servicio de urgencias por cualquier problema urgente.  2) Se le entregó christine copia de las pruebas realizadas hoy. Traiga los resultados y revíselos con rosenberg médico de atención primaria.  3) Si tiene algún empeoramiento de los síntomas o cualquier otra inquietud, regrese al servicio de urgencias de inmediato.  4) Continúe tomando annabella medicamentos caseros según las indicaciones.

## 2021-09-12 RX ORDER — CEPHALEXIN 500 MG
1 CAPSULE ORAL
Qty: 28 | Refills: 0
Start: 2021-09-12 | End: 2021-09-18

## 2023-04-06 ENCOUNTER — EMERGENCY (EMERGENCY)
Facility: HOSPITAL | Age: 33
LOS: 1 days | Discharge: HOME | End: 2023-04-06
Attending: EMERGENCY MEDICINE
Payer: COMMERCIAL

## 2023-04-06 VITALS
DIASTOLIC BLOOD PRESSURE: 64 MMHG | OXYGEN SATURATION: 100 % | TEMPERATURE: 98 F | HEART RATE: 80 BPM | RESPIRATION RATE: 18 BRPM | SYSTOLIC BLOOD PRESSURE: 105 MMHG

## 2023-04-06 VITALS
WEIGHT: 139.99 LBS | DIASTOLIC BLOOD PRESSURE: 70 MMHG | TEMPERATURE: 99 F | HEART RATE: 102 BPM | RESPIRATION RATE: 18 BRPM | OXYGEN SATURATION: 99 % | HEIGHT: 63 IN | SYSTOLIC BLOOD PRESSURE: 107 MMHG

## 2023-04-06 DIAGNOSIS — Z98.891 HISTORY OF UTERINE SCAR FROM PREVIOUS SURGERY: Chronic | ICD-10-CM

## 2023-04-06 LAB
ALBUMIN SERPL ELPH-MCNC: 4.9 G/DL — SIGNIFICANT CHANGE UP (ref 3.3–5.2)
ALP SERPL-CCNC: 97 U/L — SIGNIFICANT CHANGE UP (ref 40–120)
ALT FLD-CCNC: 19 U/L — SIGNIFICANT CHANGE UP
ANION GAP SERPL CALC-SCNC: 15 MMOL/L — SIGNIFICANT CHANGE UP (ref 5–17)
AST SERPL-CCNC: 36 U/L — HIGH
BILIRUB SERPL-MCNC: 0.3 MG/DL — LOW (ref 0.4–2)
BUN SERPL-MCNC: 8.7 MG/DL — SIGNIFICANT CHANGE UP (ref 8–20)
CALCIUM SERPL-MCNC: 9.6 MG/DL — SIGNIFICANT CHANGE UP (ref 8.4–10.5)
CHLORIDE SERPL-SCNC: 97 MMOL/L — SIGNIFICANT CHANGE UP (ref 96–108)
CO2 SERPL-SCNC: 21 MMOL/L — LOW (ref 22–29)
CREAT SERPL-MCNC: 0.86 MG/DL — SIGNIFICANT CHANGE UP (ref 0.5–1.3)
EGFR: 90 ML/MIN/1.73M2 — SIGNIFICANT CHANGE UP
GLUCOSE SERPL-MCNC: 113 MG/DL — HIGH (ref 70–99)
HCG SERPL-ACNC: <4 MIU/ML — SIGNIFICANT CHANGE UP
HCT VFR BLD CALC: 39.8 % — SIGNIFICANT CHANGE UP (ref 34.5–45)
HGB BLD-MCNC: 13.1 G/DL — SIGNIFICANT CHANGE UP (ref 11.5–15.5)
MCHC RBC-ENTMCNC: 29.8 PG — SIGNIFICANT CHANGE UP (ref 27–34)
MCHC RBC-ENTMCNC: 32.9 GM/DL — SIGNIFICANT CHANGE UP (ref 32–36)
MCV RBC AUTO: 90.7 FL — SIGNIFICANT CHANGE UP (ref 80–100)
PLATELET # BLD AUTO: 421 K/UL — HIGH (ref 150–400)
POTASSIUM SERPL-MCNC: 3.6 MMOL/L — SIGNIFICANT CHANGE UP (ref 3.5–5.3)
POTASSIUM SERPL-SCNC: 3.6 MMOL/L — SIGNIFICANT CHANGE UP (ref 3.5–5.3)
PROT SERPL-MCNC: 8.3 G/DL — SIGNIFICANT CHANGE UP (ref 6.6–8.7)
RBC # BLD: 4.39 M/UL — SIGNIFICANT CHANGE UP (ref 3.8–5.2)
RBC # FLD: 13.5 % — SIGNIFICANT CHANGE UP (ref 10.3–14.5)
SODIUM SERPL-SCNC: 133 MMOL/L — LOW (ref 135–145)
TROPONIN T SERPL-MCNC: <0.01 NG/ML — SIGNIFICANT CHANGE UP (ref 0–0.06)
WBC # BLD: 12.92 K/UL — HIGH (ref 3.8–10.5)
WBC # FLD AUTO: 12.92 K/UL — HIGH (ref 3.8–10.5)

## 2023-04-06 PROCEDURE — 85027 COMPLETE CBC AUTOMATED: CPT

## 2023-04-06 PROCEDURE — 80053 COMPREHEN METABOLIC PANEL: CPT

## 2023-04-06 PROCEDURE — 93010 ELECTROCARDIOGRAM REPORT: CPT

## 2023-04-06 PROCEDURE — 99284 EMERGENCY DEPT VISIT MOD MDM: CPT

## 2023-04-06 PROCEDURE — 96361 HYDRATE IV INFUSION ADD-ON: CPT

## 2023-04-06 PROCEDURE — 36415 COLL VENOUS BLD VENIPUNCTURE: CPT

## 2023-04-06 PROCEDURE — 99284 EMERGENCY DEPT VISIT MOD MDM: CPT | Mod: 25

## 2023-04-06 PROCEDURE — 93005 ELECTROCARDIOGRAM TRACING: CPT

## 2023-04-06 PROCEDURE — 96374 THER/PROPH/DIAG INJ IV PUSH: CPT

## 2023-04-06 PROCEDURE — 96375 TX/PRO/DX INJ NEW DRUG ADDON: CPT

## 2023-04-06 PROCEDURE — 84484 ASSAY OF TROPONIN QUANT: CPT

## 2023-04-06 PROCEDURE — T1013: CPT

## 2023-04-06 PROCEDURE — 84702 CHORIONIC GONADOTROPIN TEST: CPT

## 2023-04-06 RX ORDER — FAMOTIDINE 10 MG/ML
20 INJECTION INTRAVENOUS ONCE
Refills: 0 | Status: COMPLETED | OUTPATIENT
Start: 2023-04-06 | End: 2023-04-06

## 2023-04-06 RX ORDER — SODIUM CHLORIDE 9 MG/ML
1000 INJECTION INTRAMUSCULAR; INTRAVENOUS; SUBCUTANEOUS ONCE
Refills: 0 | Status: COMPLETED | OUTPATIENT
Start: 2023-04-06 | End: 2023-04-06

## 2023-04-06 RX ORDER — ONDANSETRON 8 MG/1
4 TABLET, FILM COATED ORAL ONCE
Refills: 0 | Status: COMPLETED | OUTPATIENT
Start: 2023-04-06 | End: 2023-04-06

## 2023-04-06 RX ORDER — MECLIZINE HCL 12.5 MG
25 TABLET ORAL ONCE
Refills: 0 | Status: COMPLETED | OUTPATIENT
Start: 2023-04-06 | End: 2023-04-06

## 2023-04-06 RX ADMIN — SODIUM CHLORIDE 1000 MILLILITER(S): 9 INJECTION INTRAMUSCULAR; INTRAVENOUS; SUBCUTANEOUS at 10:43

## 2023-04-06 RX ADMIN — Medication 30 MILLILITER(S): at 09:33

## 2023-04-06 RX ADMIN — ONDANSETRON 4 MILLIGRAM(S): 8 TABLET, FILM COATED ORAL at 09:31

## 2023-04-06 RX ADMIN — Medication 25 MILLIGRAM(S): at 09:33

## 2023-04-06 RX ADMIN — FAMOTIDINE 20 MILLIGRAM(S): 10 INJECTION INTRAVENOUS at 09:32

## 2023-04-06 NOTE — ED ADULT TRIAGE NOTE - CHIEF COMPLAINT QUOTE
became dizzy at 8am and vomited once. states her whole body feels weak, denies numbness. ambulatory without difficulty. c/o a very slight headache. no focal neuro deficits.

## 2023-04-06 NOTE — ED STATDOCS - NEUROLOGICAL, MLM
sensation is normal and strength is normal. sensation is normal and strength is normal. no ataxia, ambulating without any difficulty in ED

## 2023-04-06 NOTE — ED STATDOCS - CLINICAL SUMMARY MEDICAL DECISION MAKING FREE TEXT BOX
Pt with vertigo, chest discomfort after vomiting, will check labs, treat symptoms, give IV fluids, reassess

## 2023-04-06 NOTE — ED STATDOCS - NSFOLLOWUPINSTRUCTIONS_ED_ALL_ED_FT
Tylenol extra forte 2 compresse ogni 4 ore o Ibuprofen (motrin o advil) 3 compresse (totale 600 mg) ogni 6 ore per dolori, dolori, febbri o brividi. Mantieniti candida idratato: bevi molti liquidi tra donta succo d'arancia, tè con vianey/zenzero al limone e brodo di kenneth. Riposo. Ritorna immediatamente al pronto soccorso per un riesame se i tuoi sintomi stanno peggiorando. In chi contrario, rivolgiti al tuo medico entro 2-3 giorni per christine nuova valutazione.    Nausea    La nausea è la sensazione di george vomitare. Quando la nausea peggiora, può portare al vomito. Il vomito aumenta il rischio di disidratazione. Gli anziani e le persone con altre malattie o con un sistema immunitario debole corrono un rischio maggiore di disidratazione. Hannah liquidi chiari in piccole ma frequenti quantità tollerate. Mangia cibi insipidi e facili da digerire in piccole quantità come tollerato.    RICHIEDI IMMEDIATA ASSISTENZA MEDICA SE NUNES QUALUNQUE DEI SEGUENTI SINTOMI: febbre, incapacità di trattenere liquidi a sufficienza, vomito guillermo o con sangue, feci aldo o con sangue, stordimento/vertigini, dolore toracico, forte mal di hany, eruzione cutanea, respiro corto, raffreddore o pelle umida, confusione, dolore gretchen la minzione o qualsiasi segno di disidratazione.

## 2023-04-06 NOTE — ED STATDOCS - PROGRESS NOTE DETAILS
PATRICIO Marte: Patient moved from intake room; seen and evaluated by intake physician. HPI and PE performed by intake physician. Notes reviewed and follow-up examination performed by me consistent with initial assessment. Agree with intake physician plan and tests. PATRICIO Marte: Patient moved from intake room; seen and evaluated by intake physician. HPI and PE performed by intake physician. Notes reviewed and follow-up examination performed by me consistent with initial assessment. Agree with intake physician plan and tests.    Language Services was utilized in obtaining the H & P and throughout the duration of the patient's care. PATRICIO Marte: Patient has improvement of symptoms. 1L fluid ordered for mild hyponatremia. Will reassess. PATRICIO Marte: Reviewed results with patient who verbalized understanding.    WBC elevated likely viral etiology, mildly hyponatremic, other electrolytes wnl. HCG neg. Troponin neg. Discussed supportive management and OTC medications if needed. Discussed when to return to the ED, if needed, and follow-up with PCP within 2-3 days for further evaluation. Patient verbalized understanding. PATRICIO Marte: Reviewed results with patient who verbalized understanding.    WBC elevated likely viral etiology, mildly hyponatremic, other electrolytes wnl. HCG neg. Troponin neg. Discussed supportive management and OTC medications if needed. Discussed when to return to the ED, if needed, and follow-up with PCP within 2-3 days for further evaluation. Patient verbalized understanding.    IV removed prior to discharge.

## 2023-04-06 NOTE — ED ADULT NURSE NOTE - OBJECTIVE STATEMENT
a&ox4 c/o "vertigo like symptoms", + n/v x 2 episodes, fatigue, "burning after vomiting " in epigastric area onset this AM s/p having coffee.    LMP now, denies chest pain/sob/fever/chills

## 2023-04-06 NOTE — ED STATDOCS - NS_ ATTENDINGSCRIBEDETAILS _ED_A_ED_FT
I, Aj Gibson, performed the initial face to face bedside interview with this patient regarding history of present illness, review of symptoms and relevant past medical, social and family history.  I completed an independent physical examination.  I was the initial provider who evaluated this patient. I have signed out the follow up of any pending tests (i.e. labs, radiological studies) to the ACP.  I have communicated the patient’s plan of care and disposition with the ACP.  The history, relevant review of systems, past medical and surgical history, medical decision making, and physical examination was documented by the scribe in my presence and I attest to the accuracy of the documentation.

## 2023-04-06 NOTE — ED STATDOCS - PATIENT PORTAL LINK FT
You can access the FollowMyHealth Patient Portal offered by [facility] by registering at the following website: [email or web address]. By joining [facility]’s FollowMyHealth portal, you will also be able to view your health information using other applications (apps) compatible with our system.

## 2023-04-06 NOTE — ED STATDOCS - OBJECTIVE STATEMENT
34 y/o female with PMHx of HTN presents to the ED c/o generalized fatigue, dizziness as well as episode of vomiting earlier this morning. Pt notes mild chest discomfort that started after vomited. Pt denies fevers, cough, recent illness.  Pt currently on period

## 2023-05-30 NOTE — ED ADULT TRIAGE NOTE - BP NONINVASIVE SYSTOLIC (MM HG)
107 Sotyktu Counseling:  I discussed the most common side effects of Sotyktu including: common cold, sore throat, sinus infections, cold sores, canker sores, folliculitis, and acne.? I also discussed more serious side effects of Sotyktu including but not limited to: serious allergic reactions; increased risk for infections such as TB; cancers such as lymphomas; rhabdomyolysis and elevated CPK; and elevated triglycerides and liver enzymes.?

## 2023-12-18 NOTE — ED PROVIDER NOTE - CONSTITUTIONAL, MLM
normal... Well appearing, well nourished, awake, alert, oriented to person, place, time/situation and in no apparent distress. Patient

## 2024-05-13 ENCOUNTER — EMERGENCY (EMERGENCY)
Facility: HOSPITAL | Age: 34
LOS: 1 days | Discharge: HOME | End: 2024-05-13
Attending: EMERGENCY MEDICINE
Payer: COMMERCIAL

## 2024-05-13 VITALS
OXYGEN SATURATION: 98 % | HEIGHT: 64 IN | HEART RATE: 63 BPM | SYSTOLIC BLOOD PRESSURE: 183 MMHG | DIASTOLIC BLOOD PRESSURE: 122 MMHG | RESPIRATION RATE: 18 BRPM | WEIGHT: 138.89 LBS | TEMPERATURE: 98 F

## 2024-05-13 VITALS
TEMPERATURE: 98 F | DIASTOLIC BLOOD PRESSURE: 92 MMHG | RESPIRATION RATE: 13 BRPM | OXYGEN SATURATION: 100 % | HEART RATE: 59 BPM | SYSTOLIC BLOOD PRESSURE: 152 MMHG

## 2024-05-13 DIAGNOSIS — Z98.891 HISTORY OF UTERINE SCAR FROM PREVIOUS SURGERY: Chronic | ICD-10-CM

## 2024-05-13 LAB
ALBUMIN SERPL ELPH-MCNC: 4 G/DL — SIGNIFICANT CHANGE UP (ref 3.3–5.2)
ALP SERPL-CCNC: 94 U/L — SIGNIFICANT CHANGE UP (ref 40–120)
ALT FLD-CCNC: 27 U/L — SIGNIFICANT CHANGE UP
ANION GAP SERPL CALC-SCNC: 11 MMOL/L — SIGNIFICANT CHANGE UP (ref 5–17)
AST SERPL-CCNC: 30 U/L — SIGNIFICANT CHANGE UP
BASOPHILS # BLD AUTO: 0.03 K/UL — SIGNIFICANT CHANGE UP (ref 0–0.2)
BASOPHILS NFR BLD AUTO: 0.4 % — SIGNIFICANT CHANGE UP (ref 0–2)
BILIRUB SERPL-MCNC: <0.2 MG/DL — LOW (ref 0.4–2)
BUN SERPL-MCNC: 9.6 MG/DL — SIGNIFICANT CHANGE UP (ref 8–20)
CALCIUM SERPL-MCNC: 8.5 MG/DL — SIGNIFICANT CHANGE UP (ref 8.4–10.5)
CHLORIDE SERPL-SCNC: 103 MMOL/L — SIGNIFICANT CHANGE UP (ref 96–108)
CO2 SERPL-SCNC: 23 MMOL/L — SIGNIFICANT CHANGE UP (ref 22–29)
CREAT SERPL-MCNC: 0.48 MG/DL — LOW (ref 0.5–1.3)
EGFR: 126 ML/MIN/1.73M2 — SIGNIFICANT CHANGE UP
EOSINOPHIL # BLD AUTO: 0.05 K/UL — SIGNIFICANT CHANGE UP (ref 0–0.5)
EOSINOPHIL NFR BLD AUTO: 0.7 % — SIGNIFICANT CHANGE UP (ref 0–6)
FLUAV AG NPH QL: SIGNIFICANT CHANGE UP
FLUBV AG NPH QL: DETECTED
GLUCOSE SERPL-MCNC: 97 MG/DL — SIGNIFICANT CHANGE UP (ref 70–99)
HCG SERPL-ACNC: <4 MIU/ML — SIGNIFICANT CHANGE UP
HCT VFR BLD CALC: 37.6 % — SIGNIFICANT CHANGE UP (ref 34.5–45)
HGB BLD-MCNC: 12.1 G/DL — SIGNIFICANT CHANGE UP (ref 11.5–15.5)
IMM GRANULOCYTES NFR BLD AUTO: 0.3 % — SIGNIFICANT CHANGE UP (ref 0–0.9)
LYMPHOCYTES # BLD AUTO: 1.02 K/UL — SIGNIFICANT CHANGE UP (ref 1–3.3)
LYMPHOCYTES # BLD AUTO: 13.5 % — SIGNIFICANT CHANGE UP (ref 13–44)
MCHC RBC-ENTMCNC: 28.1 PG — SIGNIFICANT CHANGE UP (ref 27–34)
MCHC RBC-ENTMCNC: 32.2 GM/DL — SIGNIFICANT CHANGE UP (ref 32–36)
MCV RBC AUTO: 87.2 FL — SIGNIFICANT CHANGE UP (ref 80–100)
MONOCYTES # BLD AUTO: 0.37 K/UL — SIGNIFICANT CHANGE UP (ref 0–0.9)
MONOCYTES NFR BLD AUTO: 4.9 % — SIGNIFICANT CHANGE UP (ref 2–14)
NEUTROPHILS # BLD AUTO: 6.04 K/UL — SIGNIFICANT CHANGE UP (ref 1.8–7.4)
NEUTROPHILS NFR BLD AUTO: 80.2 % — HIGH (ref 43–77)
NT-PROBNP SERPL-SCNC: 70 PG/ML — SIGNIFICANT CHANGE UP (ref 0–300)
PLATELET # BLD AUTO: 236 K/UL — SIGNIFICANT CHANGE UP (ref 150–400)
POTASSIUM SERPL-MCNC: 3.9 MMOL/L — SIGNIFICANT CHANGE UP (ref 3.5–5.3)
POTASSIUM SERPL-SCNC: 3.9 MMOL/L — SIGNIFICANT CHANGE UP (ref 3.5–5.3)
PROT SERPL-MCNC: 7 G/DL — SIGNIFICANT CHANGE UP (ref 6.6–8.7)
RBC # BLD: 4.31 M/UL — SIGNIFICANT CHANGE UP (ref 3.8–5.2)
RBC # FLD: 14.9 % — HIGH (ref 10.3–14.5)
RSV RNA NPH QL NAA+NON-PROBE: SIGNIFICANT CHANGE UP
SARS-COV-2 RNA SPEC QL NAA+PROBE: SIGNIFICANT CHANGE UP
SODIUM SERPL-SCNC: 137 MMOL/L — SIGNIFICANT CHANGE UP (ref 135–145)
TROPONIN T, HIGH SENSITIVITY RESULT: <6 NG/L — SIGNIFICANT CHANGE UP (ref 0–51)
WBC # BLD: 7.53 K/UL — SIGNIFICANT CHANGE UP (ref 3.8–10.5)
WBC # FLD AUTO: 7.53 K/UL — SIGNIFICANT CHANGE UP (ref 3.8–10.5)

## 2024-05-13 PROCEDURE — 84484 ASSAY OF TROPONIN QUANT: CPT

## 2024-05-13 PROCEDURE — 96374 THER/PROPH/DIAG INJ IV PUSH: CPT

## 2024-05-13 PROCEDURE — 96361 HYDRATE IV INFUSION ADD-ON: CPT

## 2024-05-13 PROCEDURE — 87637 SARSCOV2&INF A&B&RSV AMP PRB: CPT

## 2024-05-13 PROCEDURE — 99285 EMERGENCY DEPT VISIT HI MDM: CPT | Mod: 25

## 2024-05-13 PROCEDURE — 93005 ELECTROCARDIOGRAM TRACING: CPT

## 2024-05-13 PROCEDURE — T1013: CPT

## 2024-05-13 PROCEDURE — 71045 X-RAY EXAM CHEST 1 VIEW: CPT | Mod: 26

## 2024-05-13 PROCEDURE — 96375 TX/PRO/DX INJ NEW DRUG ADDON: CPT

## 2024-05-13 PROCEDURE — 80053 COMPREHEN METABOLIC PANEL: CPT

## 2024-05-13 PROCEDURE — 36415 COLL VENOUS BLD VENIPUNCTURE: CPT

## 2024-05-13 PROCEDURE — 93010 ELECTROCARDIOGRAM REPORT: CPT

## 2024-05-13 PROCEDURE — 71045 X-RAY EXAM CHEST 1 VIEW: CPT

## 2024-05-13 PROCEDURE — 83880 ASSAY OF NATRIURETIC PEPTIDE: CPT

## 2024-05-13 PROCEDURE — 99285 EMERGENCY DEPT VISIT HI MDM: CPT

## 2024-05-13 PROCEDURE — 84702 CHORIONIC GONADOTROPIN TEST: CPT

## 2024-05-13 PROCEDURE — 85025 COMPLETE CBC W/AUTO DIFF WBC: CPT

## 2024-05-13 RX ORDER — LOSARTAN POTASSIUM 100 MG/1
50 TABLET, FILM COATED ORAL DAILY
Refills: 0 | Status: DISCONTINUED | OUTPATIENT
Start: 2024-05-13 | End: 2024-05-20

## 2024-05-13 RX ORDER — SODIUM CHLORIDE 9 MG/ML
1000 INJECTION INTRAMUSCULAR; INTRAVENOUS; SUBCUTANEOUS ONCE
Refills: 0 | Status: COMPLETED | OUTPATIENT
Start: 2024-05-13 | End: 2024-05-13

## 2024-05-13 RX ORDER — KETOROLAC TROMETHAMINE 30 MG/ML
15 SYRINGE (ML) INJECTION ONCE
Refills: 0 | Status: DISCONTINUED | OUTPATIENT
Start: 2024-05-13 | End: 2024-05-13

## 2024-05-13 RX ORDER — ACETAMINOPHEN 500 MG
975 TABLET ORAL ONCE
Refills: 0 | Status: COMPLETED | OUTPATIENT
Start: 2024-05-13 | End: 2024-05-13

## 2024-05-13 RX ORDER — IBUPROFEN 200 MG
1 TABLET ORAL
Qty: 28 | Refills: 0
Start: 2024-05-13 | End: 2024-05-19

## 2024-05-13 RX ORDER — ONDANSETRON 8 MG/1
1 TABLET, FILM COATED ORAL
Qty: 3 | Refills: 0
Start: 2024-05-13 | End: 2024-05-17

## 2024-05-13 RX ORDER — LOSARTAN/HYDROCHLOROTHIAZIDE 100MG-25MG
1 TABLET ORAL
Qty: 30 | Refills: 0
Start: 2024-05-13 | End: 2024-06-11

## 2024-05-13 RX ORDER — HYDRALAZINE HCL 50 MG
5 TABLET ORAL ONCE
Refills: 0 | Status: COMPLETED | OUTPATIENT
Start: 2024-05-13 | End: 2024-05-13

## 2024-05-13 RX ADMIN — Medication 15 MILLIGRAM(S): at 13:28

## 2024-05-13 RX ADMIN — Medication 5 MILLIGRAM(S): at 11:17

## 2024-05-13 RX ADMIN — Medication 975 MILLIGRAM(S): at 11:17

## 2024-05-13 RX ADMIN — Medication 975 MILLIGRAM(S): at 13:23

## 2024-05-13 RX ADMIN — SODIUM CHLORIDE 1000 MILLILITER(S): 9 INJECTION INTRAMUSCULAR; INTRAVENOUS; SUBCUTANEOUS at 13:23

## 2024-05-13 RX ADMIN — SODIUM CHLORIDE 1000 MILLILITER(S): 9 INJECTION INTRAMUSCULAR; INTRAVENOUS; SUBCUTANEOUS at 11:17

## 2024-05-13 RX ADMIN — LOSARTAN POTASSIUM 50 MILLIGRAM(S): 100 TABLET, FILM COATED ORAL at 11:18

## 2024-05-13 NOTE — ED ADULT NURSE NOTE - OBJECTIVE STATEMENT
Pt c/o elevated blood pressure, cough, fever Pt c/o elevated blood pressure, cough, fever. Pt has h/o HTN and takes medication but is unsure of the name.

## 2024-05-13 NOTE — ED PROVIDER NOTE - CARDIOVASCULAR [-], MLM
Subjective       Loyda Richmond is a 21 y.o. female.     Chief Complaint   Patient presents with   • ADD       History obtained from the patient.      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 Her ADHD has been well controlled since the last visit.   Interval Events: She has increased stress at work, and is having problems with focus and concentration. She is easily distracted.   Target Symptoms:   1. Hyperactive behavior is denied.   2. Impulsive behavior is denied.   3. Difficulty concentrating is denied.   Medications: Vyvanse daily. The patient takes her medications all of the time.   Medication side effects include no anorexia, no headache, no tics, no irritability, no problems sleeping, no weight loss, no abdominal pain, no nausea and no suicidal ideation.     The patient states she is also interested in changing birth control.  She would like some information on other forms of birth control, such as IUDs and implants.    Current Outpatient Prescriptions on File Prior to Visit   Medication Sig Dispense Refill   • clindamycin-benzoyl peroxide (BENZACLIN) 1-5 % gel Apply  topically 2 (two) times a day. 50 g 0   • desogestrel-ethinyl estradiol (KARIVA) 0.15-0.02/0.01 MG (21/5) per tablet Take 1 tablet by mouth daily. 28 tablet 11   • fluticasone (FLONASE) 50 MCG/ACT nasal spray into each nostril.     • ibuprofen (ADVIL,MOTRIN) 200 MG tablet Take 200 mg by mouth Every 6 (Six) Hours As Needed for Mild Pain (1-3).     • levocetirizine (XYZAL) 5 MG tablet Take 1 tablet by mouth Every Evening. 30 tablet 0   • lisdexamfetamine (VYVANSE) 60 MG capsule Take 1 capsule by mouth Daily. 30 capsule 0     No current facility-administered medications on file prior to visit.          The following portions of the patient's history were reviewed and updated as appropriate: allergies, current medications, past family history, past medical history, past social  history, past surgical history and problem list.    Review of Systems   Constitutional: Negative for appetite change, fatigue and unexpected weight change.   Respiratory: Negative for shortness of breath.    Cardiovascular: Negative for chest pain and palpitations.   Gastrointestinal: Negative for abdominal pain, diarrhea, nausea and vomiting.   Neurological: Negative for headaches.        No tics.  No memory loss.   Psychiatric/Behavioral: Negative for agitation, confusion, decreased concentration, sleep disturbance and suicidal ideas. The patient is not nervous/anxious.         No depression.         Objective       Blood pressure 112/74, pulse 84, temperature 98.2 °F (36.8 °C), temperature source Temporal Artery , resp. rate 16, weight 125 lb (56.7 kg).      Physical Exam   Constitutional: She appears well-developed and well-nourished.   Cardiovascular: Normal rate, regular rhythm and normal heart sounds.    No murmur heard.  Pulmonary/Chest: Effort normal and breath sounds normal.   Neurological: She is alert.   Psychiatric: She has a normal mood and affect.   Nursing note and vitals reviewed.    Counseling was given to patient for the following topics: birth control counseling including side effects, importance of medication compliance, risks and benefits of treament options, safe sex and side effects of medications . Total time of the encounter was 15 minutes and 12 minutes was spent counseling.        Assessment / Plan:    Loyda was seen today for add.    Diagnoses and all orders for this visit:    Attention and concentration deficit    Encounter for contraceptive management, unspecified contraceptive encounter type  -     Ambulatory Referral to Gynecology        The patient was instructed in the side effects of the medication.  Risks of the potential for tolerance, dependence, and addiction were discussed.  The patient was instructed to take the lowest dosage of the medication, at the lowest frequency, and  for the shortest period of time possible.  The patient was instructed not to receive controlled substances or narcotics from other doctors, and not to giveaway or sell the medication.    The patient was instructed to abstain from illicit drug use.  The patient was instructed to avoid alcohol while taking these medications.      Narcotics/controlled substance agreement, Charles report, and Urine Drug Screen were updated today if needed.      Return in about 3 months (around 10/17/2017) for Recheck-ADD.     no chest pain

## 2024-05-13 NOTE — ED PROVIDER NOTE - NSFOLLOWUPINSTRUCTIONS_ED_ALL_ED_FT
Hipertensión en los adultos  Hypertension, Adult  La presión arterial tima (hipertensión) se produce cuando la fuerza de la racquel bombea a través de las arterias con mucha fuerza. Las arterias son los vasos sanguíneos que transportan la racquel desde el corazón al walker del cuerpo. La hipertensión hace que el corazón georgina más esfuerzo para bombear racquel y puede provocar que las arterias se estrechen o endurezcan. La hipertensión no tratada o no controlada puede causar infarto de miocardio, insuficiencia cardíaca, accidente cerebrovascular, enfermedad renal y otros problemas.    Christine lectura de la presión arterial consta de un número más alto sobre un número más bajo. En condiciones ideales, la presión arterial debe estar por debajo de 120/80. El primer número (“superior”) es la presión sistólica. Es la medida de la presión de las arterias cuando el corazón late. El eddie número (“inferior”) es la presión diastólica. Es la medida de la presión en las arterias cuando el corazón se relaja.    ¿Cuáles son las causas?  Se desconoce la causa exacta de esta afección. Hay algunas afecciones que causan presión arterial tima.    ¿Qué incrementa el riesgo?  Ciertos factores pueden hacer que christine persona sea más propensa a desarrollar presión arterial tima. Algunos de estos factores de riesgo están bajo rosenberg control, por ejemplo, los siguientes:  Fumar.  No hacer la cantidad suficiente de actividad física o ejercicio.  Tener sobrepeso.  Consumir mucha grasa, azúcar, calorías o sal (sodio) en rosenberg dieta.  Beber alcohol en exceso.  Otros factores de riesgo son los siguientes:  Tener antecedentes personales de enfermedad cardíaca, diabetes, colesterol alto o enfermedad renal.  Estrés.  Tener antecedentes familiares de presión arterial tima y colesterol alto.  Tener apnea obstructiva del sueño.  Edad. El riesgo aumenta con la edad.  ¿Cuáles son los signos o síntomas?  Es posible que la presión arterial tima no cause síntomas. La presión arterial muy tima (crisis hipertensiva) puede provocar:  Dolor de waldemar.  Latidos cardíacos acelerados o irregulares (palpitaciones).  Falta de aire.  Hemorragia nasal.  Náuseas y vómitos.  Cambios en la visión.  Dolor intenso en el pecho, mareos y convulsiones.  ¿Cómo se diagnostica?  Esta afección se diagnostica al medir rosenberg presión arterial mientras se encuentra sentado, con el brazo apoyado sobre christine superficie plana, las piernas sin cruzar y los pies savi apoyados en el piso. El brazalete del tensiómetro debe colocarse directamente sobre la piel de la parte superior del brazo y al nivel de rosenberg corazón. La presión arterial debe medirse al menos dos veces en el mismo brazo. Determinadas condiciones pueden causar christine diferencia de presión arterial entre el brazo deirdre y el derecho.    Si tiene christine lectura de presión arterial tima gretchen christine visita o si tiene presión arterial normal con otros factores de riesgo, se le podrá pedir que georgina lo siguiente:  Que regrese otro día para volver a controlar rosenberg presión arterial nuevamente.  Que se controle la presión arterial en rosenberg casa gretchen 1 semana o más.  Si se le diagnostica hipertensión, es posible que se le realicen otros análisis de racquel o estudios de diagnóstico por imágenes para ayudar a rosenberg médico a comprender rosenberg riesgo general de tener otras afecciones.    ¿Cómo se trata?  Esta afección se trata haciendo cambios saludables en el estilo de keagan, tales crow ingerir alimentos saludables, realizar más ejercicio y reducir el consumo de alcohol. Es posible que lo deriven para que reciba asesoramiento sobre christine dieta saludable y actividad física.    El médico puede recetarle medicamentos si los cambios en el estilo de keagan no son suficientes para lograr controlar la presión arterial y si:  Rosenberg presión arterial sistólica está por encima de 130.  Rosenberg presión arterial diastólica está por encima de 80.  La presión arterial deseada puede variar en función de las enfermedades, la edad y otros factores personales.    Siga estas instrucciones en rosenberg casa:  Comida y bebida    A plate with examples of foods in a healthy diet.  Siga christine dieta con alto contenido de fibras y potasio, y con bajo contenido de sodio, azúcar agregada y grasas. Un ejemplo de christiane plan de alimentación se denomina dieta DASH. DASH es la sigla en inglés de “Enfoques alimentarios para detener la hipertensión”. Para alimentarse de esta manera:  Coma mucha fruta y verdura fresca. Trate de que la mitad del plato de cada comida sea de frutas y verduras.  Coma cereales integrales, crow pasta integral, arroz integral o pan integral. Llene aproximadamente un cuarto del plato con cereales integrales.  Coma y randi productos lácteos con bajo contenido de grasa, crow leche descremada o yogur bajo en grasas.  Evite la ingesta de [place]ham de carne grasa, carne procesada o curada, y carne de ave con piel. Llene aproximadamente un cuarto del plato con proteínas magras, crow pescado, kenneth sin piel, frijoles, huevos o tofu.  Evite ingerir alimentos prehechos y procesados. En general, estos tienen mayor cantidad de sodio, azúcar agregada y grasa.  Reduzca rosenberg ingesta diaria de sodio. Muchas personas que tienen hipertensión deben comer menos de 1,500 mg de sodio por día.  No randi alcohol si:  Rosenberg médico le indica no hacerlo.  Está embarazada, puede estar embarazada o está tratando de quedar embarazada.  Si sreekanth alcohol:  Limite la cantidad que sreekanth a lo siguiente:  De 0 a 1 medida por día para las mujeres.  De 0 a 2 medidas por día para los hombres.  Sepa cuánta cantidad de alcohol hay en las bebidas que olivier. En [place], christine medida equivale a christine botella de cerveza de 12 oz (355 ml), un vaso de vino de 5 oz (148 ml) o un vaso de christine bebida alcohólica de tima graduación de 1½ oz (44 ml).  Estilo de keagan    A blood pressure monitor and cuff.   Trabaje con rosenberg médico para mantener un peso saludable o perder peso. Pregúntele cuál es el peso recomendado para usted.  Realice al menos 30 minutos de ejercicio que georgina que se acelere rosenberg corazón (ejercicio aeróbico) la mayoría de los días de la semana. Estas actividades pueden incluir caminar, nadar o andar en bicicleta.  Incluya ejercicios para fortalecer annabella músculos (ejercicios de resistencia), crow Pilates o levantamiento de pesas, crow parte de rosenberg rutina semanal de ejercicios. Intente realizar 30 minutos de christiane tipo de ejercicios al menos brenna días a la semana.  No consuma ningún producto que contenga nicotina o tabaco. Estos productos incluyen cigarrillos, tabaco para mascar y aparatos de vapeo, crow los cigarrillos electrónicos. Si necesita ayuda para dejar de fumar, consulte al médico.  Contrólese la presión arterial en rosenberg casa según las indicaciones del médico.  Concurra a todas las visitas de seguimiento. Anna es importante.  Medicamentos    Use los medicamentos de venta thelma y los recetados solamente crow se lo haya indicado el médico. Siga cuidadosamente las indicaciones. Los medicamentos para la presión arterial deben tomarse según las indicaciones.  No omita las dosis de medicamentos para la presión arterial. Si lo hace, estará en riesgo de tener problemas y puede hacer que los medicamentos eliz menos eficaces.  Pregúntele a rosenberg médico a qué efectos secundarios o reacciones a los medicamentos debe prestar atención.  Comuníquese con un médico si:  Piensa que tiene christine reacción a un medicamento que está tomando.  Tiene livan de waldemar frecuentes (recurrentes).  Se siente mareado.  Tiene hinchazón en los tobillos.  Tiene problemas de visión.  Solicite ayuda inmediatamente si:  Siente un dolor de waldemar intenso o confusión.  Siente debilidad inusual o adormecimiento.  Siente que va a desmayarse.  Siente un dolor intenso en el pecho o el abdomen.  Vomita repetidas veces.  Tiene dificultad para respirar.  Estos síntomas pueden indicar christine emergencia. Solicite ayuda de inmediato. Llame al 911.  No espere a rod si los síntomas desaparecen.  No conduzca por annabella propios medios hasta el hospital.  Resumen  La hipertensión se produce cuando la racquel bombea en las arterias con mucha fuerza. Si esta afección no se controla, podría correr riesgo de tener complicaciones graves.  La presión arterial deseada puede variar en función de las enfermedades, la edad y otros factores personales. Para la mayoría de las personas, christine presión arterial normal es mando que 120/80.  La hipertensión se trata con cambios en el estilo de keagan, medicamentos o christine combinación de ambos. Los cambios en el estilo de keagan incluyen pérdida de peso, ingerir alimentos sanos, seguir christine dieta baja en sodio, hacer más ejercicio y limitar el consumo de alcohol.  Esta información no tiene crow fin reemplazar el consejo del médico. Asegúrese de hacerle al médico cualquier pregunta que tenga.

## 2024-05-13 NOTE — ED PROVIDER NOTE - PHYSICAL EXAMINATION
General: NAD  Head: NC, AT  EENT: EOMI, no scleral icterus, dry mucous membranes  Cardiac: RRR, no apparent murmurs, no lower extremity edema  Respiratory: CTABL, no respiratory distress   Abdomen: soft, ND, NT, nonperitonitic  MSK/Vascular: full ROM, soft compartments, warm extremities  Neuro: AAOx3, sensation to light touch intact  Psych: calm, cooperative

## 2024-05-13 NOTE — ED PROVIDER NOTE - ATTENDING CONTRIBUTION TO CARE
I personally saw the patient with the resident, and completed the key components of the history and physical exam. I then discussed the management plan with the resident.    33 y/o F with PMH HTN (not compliant with medication) presents for dizziness that is worse when sitting up, associated with fever, cough, SOB, chest pain while coughing and nausea with vomiting today. She is taking an OTC cough medication, advil and tylenol. No sick contacts.    NAD, well appearing, RRR, lungs CTA B/L, abd soft NT/ND, no LE edema, 2+ symmetrical distal pulses.    EKG without ischemia, patient very hypertensive, from review of outpatient med list, patient has not picked up her medications since November.     Likely viral URI plus hypertensive urgency vs. emergency. Will treat with IV hydralazine and restart PO antihypertensives and reassess.

## 2024-05-13 NOTE — ED PROVIDER NOTE - OBJECTIVE STATEMENT
33 y/o female w/PMHx of HTN presenting to the ER w/2-3 days of cough, sob, fever/chills, and dizziness. Also endorsing one episode of vomiting yday. Pt states she tried Advil, Tylenol, and Theraflu without relief. Pt has not taken her BP meds in over a week due to her symptoms. She does not remember what her medications are named. No CP, back pain, abd pain, leg pain, recent travel, sick contacts, urinary sx.

## 2024-05-13 NOTE — ED ADULT TRIAGE NOTE - CHIEF COMPLAINT QUOTE
Pt arrives to ED c/o  subjective fever / cough / dizziness since Saturday- Triage upgraded pt hypertensive  during Triage

## 2024-05-13 NOTE — ED PROVIDER NOTE - NSFOLLOWUPCLINICS_GEN_ALL_ED_FT
[facility] Cardiology  Cardiology  [address] [place] [address]  Phone: [phone]  Fax:   Follow Up Time: Urgent

## 2024-05-13 NOTE — ED PROVIDER NOTE - CLINICAL SUMMARY MEDICAL DECISION MAKING FREE TEXT BOX
33 y/o female presenting w/2-3 days of cough, chills, and nausea. Likely upper respiratory symptoms. Pt noted to be very hypertensive on arrival with BP 180s-200s/120s-130s. Per ED pharmacist pt last picked up Amlodipine 10mg and Olmesartan 20mg in November. Will give 5mg IV hydralazine as HR in the 60s, 50mg Losartan, IVF + Tylenol for headache/dizziness. EKG, Cr/Trop/BNP, and CXR. 33 y/o female presenting w/2-3 days of cough, chills, and nausea. Likely upper respiratory symptoms. Pt noted to be very hypertensive on arrival with BP 180s-200s/120s-130s. Per ED pharmacist pt last picked up Amlodipine 10mg and Olmesartan 20mg in November. Will give 5mg IV hydralazine as HR in the 60s, 50mg Losartan, IVF + Tylenol for headache/dizziness. EKG, Cr/Trop/BNP, and CXR.    Progress: BP improved, no signs of end organ damage, symptomatic improvement, found to be Flu B +. Medication refilled.

## 2024-05-13 NOTE — ED PROVIDER NOTE - PATIENT PORTAL LINK FT
You can access the FollowMyHealth Patient Portal offered by [facility] by registering at the following website: [email or web address]. By joining [facility]’s FollowMyHealth portal, you will also be able to view your health information using other applications (apps) compatible with our system.

## 2024-05-13 NOTE — ED ADULT TRIAGE NOTE - HEIGHT IN CM
VAD CLINIC PATIENT INSTRUCTIONS    1.) Return to VAD clinic in 8-10 weeks.     2.) Please have labs drawn prior to your next clinic appointment. The lab is located at [facility] which is located on the 1st floor of the Outpatient Pavilion.     3.) Please continue your current medications.    4.) Please have CBC, CMP, MG, LDH and INR Prior to next clinic visit.  All lab results should be faxed to VAD team at Fax [phone]. Please call critical results to VAD Team at . [phone].    5.) Monitor daily weight, temperature and VAD parameters. Follow a No added salt, low sodium diet.     6.) please call to let us know if you have any side effects such as nausea, vomiting, fever, chills, dizziness after your covid vaccine    7.) Please call VAD pager at . [phone] and ask to speak with VAD Coordinator on call for any of the following:     -Chest pain   -Worsening shortness of breath   -Weight gain greater than 2 pounds in 1 day-OR- 5 pounds in 1 week   -ICD shock   -Palpitations, lightheadedness or dizziness   -Blood in urine or stool   -Bloody nose  -Headache, vision changes, signs of stroke which include any of the following: one sided weakness, numbness or tingling, difficulty speaking or swallowing, facial droop, confusion, disorientation, altered responsiveness     -Fever or chills, Temp greater than 100   -Signs of driveline infection which include redness, drainage or pain    -Trauma to driveline   -ANY RED or flashing yellow VAD alarms   -If you need to change to back up    -Call for VAD flows less than 3 and greater than 8   -Call for pump power increase 2 marin above baseline    
162.56

## 2024-07-12 NOTE — ED ADULT NURSE NOTE - AS PAIN REST
What is the next step to proceed with weight loss surgery?    Please be aware that any co-pays or deductibles may be requested prior to testing and / or procedures.    You will need to schedule a psychological evaluation for weight loss surgery.  Patients will be required to complete all psychological testing as required by the mental health provider. Patients must also follow all of the provider's recommendations before weight loss surgery can be scheduled.     The evaluation must be done a standard way for weight loss surgery. We strongly recommend that you contact one of our preferred providers listed below to arrange this:      Hudson Werner, [facility]-  [address], [place]   [phone]    Dr. Tess Nguyen, PhD , [facility]  [facility] [phone]      Dr. Rehan Flores, PhD     [address], [place]    [phone]      You will also need to plan on attending a 2 hour nutrition class at the [facility] prior to your surgery.  We will schedule this for you when we schedule your surgery.    Please remember to have your labs drawn 10 days prior to your first scheduled dietary appointment.    Please remember, that while we will submit your case to insurance for surgery authorization, it is your responsibility to know if your plan covers weight loss surgery and keep up-to-date with changes to your insurance coverage.  We will do everything possible to help you get approved for weight loss surgery, but cannot guarantee an approval.     Please note that you will not be submitted to your insurance company until all pre-operative testing requirements are met.    Last Surgical Weight Loss:       No data to display                 0 (no pain/absence of nonverbal indicators of pain)

## 2024-10-03 ENCOUNTER — EMERGENCY (EMERGENCY)
Facility: HOSPITAL | Age: 34
LOS: 1 days | Discharge: HOME | End: 2024-10-03
Attending: STUDENT IN AN ORGANIZED HEALTH CARE EDUCATION/TRAINING PROGRAM
Payer: COMMERCIAL

## 2024-10-03 VITALS
SYSTOLIC BLOOD PRESSURE: 188 MMHG | TEMPERATURE: 98 F | HEART RATE: 60 BPM | DIASTOLIC BLOOD PRESSURE: 90 MMHG | RESPIRATION RATE: 19 BRPM | OXYGEN SATURATION: 100 %

## 2024-10-03 VITALS
RESPIRATION RATE: 18 BRPM | DIASTOLIC BLOOD PRESSURE: 126 MMHG | HEIGHT: 64 IN | WEIGHT: 143.52 LBS | HEART RATE: 82 BPM | SYSTOLIC BLOOD PRESSURE: 197 MMHG | TEMPERATURE: 98 F | OXYGEN SATURATION: 99 %

## 2024-10-03 DIAGNOSIS — Z98.891 HISTORY OF UTERINE SCAR FROM PREVIOUS SURGERY: Chronic | ICD-10-CM

## 2024-10-03 LAB
ALBUMIN SERPL ELPH-MCNC: 3.9 G/DL — SIGNIFICANT CHANGE UP (ref 3.3–5.2)
ALP SERPL-CCNC: 70 U/L — SIGNIFICANT CHANGE UP (ref 40–120)
ALT FLD-CCNC: 11 U/L — SIGNIFICANT CHANGE UP
ANION GAP SERPL CALC-SCNC: 12 MMOL/L — SIGNIFICANT CHANGE UP (ref 5–17)
APPEARANCE UR: CLEAR — SIGNIFICANT CHANGE UP
AST SERPL-CCNC: 22 U/L — SIGNIFICANT CHANGE UP
BACTERIA # UR AUTO: NEGATIVE /HPF — SIGNIFICANT CHANGE UP
BASOPHILS # BLD AUTO: 0.09 K/UL — SIGNIFICANT CHANGE UP (ref 0–0.2)
BASOPHILS NFR BLD AUTO: 1 % — SIGNIFICANT CHANGE UP (ref 0–2)
BILIRUB SERPL-MCNC: <0.2 MG/DL — LOW (ref 0.4–2)
BILIRUB UR-MCNC: NEGATIVE — SIGNIFICANT CHANGE UP
BUN SERPL-MCNC: 7.6 MG/DL — LOW (ref 8–20)
CALCIUM SERPL-MCNC: 8.6 MG/DL — SIGNIFICANT CHANGE UP (ref 8.4–10.5)
CAST: 0 /LPF — SIGNIFICANT CHANGE UP (ref 0–4)
CHLORIDE SERPL-SCNC: 105 MMOL/L — SIGNIFICANT CHANGE UP (ref 96–108)
CO2 SERPL-SCNC: 21 MMOL/L — LOW (ref 22–29)
COLOR SPEC: YELLOW — SIGNIFICANT CHANGE UP
CREAT SERPL-MCNC: 0.54 MG/DL — SIGNIFICANT CHANGE UP (ref 0.5–1.3)
DIFF PNL FLD: NEGATIVE — SIGNIFICANT CHANGE UP
EGFR: 122 ML/MIN/1.73M2 — SIGNIFICANT CHANGE UP
EOSINOPHIL # BLD AUTO: 0.06 K/UL — SIGNIFICANT CHANGE UP (ref 0–0.5)
EOSINOPHIL NFR BLD AUTO: 0.6 % — SIGNIFICANT CHANGE UP (ref 0–6)
GLUCOSE SERPL-MCNC: 102 MG/DL — HIGH (ref 70–99)
GLUCOSE UR QL: NEGATIVE MG/DL — SIGNIFICANT CHANGE UP
HCG SERPL-ACNC: HIGH MIU/ML
HCT VFR BLD CALC: 33.4 % — LOW (ref 34.5–45)
HGB BLD-MCNC: 10.8 G/DL — LOW (ref 11.5–15.5)
IMM GRANULOCYTES NFR BLD AUTO: 0.2 % — SIGNIFICANT CHANGE UP (ref 0–0.9)
KETONES UR-MCNC: NEGATIVE MG/DL — SIGNIFICANT CHANGE UP
LEUKOCYTE ESTERASE UR-ACNC: NEGATIVE — SIGNIFICANT CHANGE UP
LYMPHOCYTES # BLD AUTO: 1.7 K/UL — SIGNIFICANT CHANGE UP (ref 1–3.3)
LYMPHOCYTES # BLD AUTO: 18.1 % — SIGNIFICANT CHANGE UP (ref 13–44)
MCHC RBC-ENTMCNC: 27.8 PG — SIGNIFICANT CHANGE UP (ref 27–34)
MCHC RBC-ENTMCNC: 32.3 GM/DL — SIGNIFICANT CHANGE UP (ref 32–36)
MCV RBC AUTO: 86.1 FL — SIGNIFICANT CHANGE UP (ref 80–100)
MONOCYTES # BLD AUTO: 0.56 K/UL — SIGNIFICANT CHANGE UP (ref 0–0.9)
MONOCYTES NFR BLD AUTO: 6 % — SIGNIFICANT CHANGE UP (ref 2–14)
NEUTROPHILS # BLD AUTO: 6.96 K/UL — SIGNIFICANT CHANGE UP (ref 1.8–7.4)
NEUTROPHILS NFR BLD AUTO: 74.1 % — SIGNIFICANT CHANGE UP (ref 43–77)
NITRITE UR-MCNC: NEGATIVE — SIGNIFICANT CHANGE UP
PH UR: 6.5 — SIGNIFICANT CHANGE UP (ref 5–8)
PLATELET # BLD AUTO: 328 K/UL — SIGNIFICANT CHANGE UP (ref 150–400)
POTASSIUM SERPL-MCNC: 3.6 MMOL/L — SIGNIFICANT CHANGE UP (ref 3.5–5.3)
POTASSIUM SERPL-SCNC: 3.6 MMOL/L — SIGNIFICANT CHANGE UP (ref 3.5–5.3)
PROT SERPL-MCNC: 6.5 G/DL — LOW (ref 6.6–8.7)
PROT UR-MCNC: NEGATIVE MG/DL — SIGNIFICANT CHANGE UP
RBC # BLD: 3.88 M/UL — SIGNIFICANT CHANGE UP (ref 3.8–5.2)
RBC # FLD: 14.8 % — HIGH (ref 10.3–14.5)
RBC CASTS # UR COMP ASSIST: 1 /HPF — SIGNIFICANT CHANGE UP (ref 0–4)
SODIUM SERPL-SCNC: 138 MMOL/L — SIGNIFICANT CHANGE UP (ref 135–145)
SP GR SPEC: 1.01 — SIGNIFICANT CHANGE UP (ref 1–1.03)
SQUAMOUS # UR AUTO: 3 /HPF — SIGNIFICANT CHANGE UP (ref 0–5)
UROBILINOGEN FLD QL: 1 MG/DL — SIGNIFICANT CHANGE UP (ref 0.2–1)
WBC # BLD: 9.39 K/UL — SIGNIFICANT CHANGE UP (ref 3.8–10.5)
WBC # FLD AUTO: 9.39 K/UL — SIGNIFICANT CHANGE UP (ref 3.8–10.5)
WBC UR QL: 0 /HPF — SIGNIFICANT CHANGE UP (ref 0–5)

## 2024-10-03 PROCEDURE — 80053 COMPREHEN METABOLIC PANEL: CPT

## 2024-10-03 PROCEDURE — 99284 EMERGENCY DEPT VISIT MOD MDM: CPT

## 2024-10-03 PROCEDURE — 86901 BLOOD TYPING SEROLOGIC RH(D): CPT

## 2024-10-03 PROCEDURE — 86900 BLOOD TYPING SEROLOGIC ABO: CPT

## 2024-10-03 PROCEDURE — 76817 TRANSVAGINAL US OBSTETRIC: CPT | Mod: 26

## 2024-10-03 PROCEDURE — 85025 COMPLETE CBC W/AUTO DIFF WBC: CPT

## 2024-10-03 PROCEDURE — 81001 URINALYSIS AUTO W/SCOPE: CPT

## 2024-10-03 PROCEDURE — 86850 RBC ANTIBODY SCREEN: CPT

## 2024-10-03 PROCEDURE — 76801 OB US < 14 WKS SINGLE FETUS: CPT

## 2024-10-03 PROCEDURE — 36415 COLL VENOUS BLD VENIPUNCTURE: CPT

## 2024-10-03 PROCEDURE — T1013: CPT

## 2024-10-03 PROCEDURE — 87086 URINE CULTURE/COLONY COUNT: CPT

## 2024-10-03 PROCEDURE — 84702 CHORIONIC GONADOTROPIN TEST: CPT

## 2024-10-03 PROCEDURE — 76817 TRANSVAGINAL US OBSTETRIC: CPT

## 2024-10-03 PROCEDURE — 76801 OB US < 14 WKS SINGLE FETUS: CPT | Mod: 26

## 2024-10-03 PROCEDURE — 99284 EMERGENCY DEPT VISIT MOD MDM: CPT | Mod: 25

## 2024-10-03 RX ORDER — LABETALOL HYDROCHLORIDE 200 MG/1
100 TABLET, FILM COATED ORAL ONCE
Refills: 0 | Status: COMPLETED | OUTPATIENT
Start: 2024-10-03 | End: 2024-10-03

## 2024-10-03 RX ORDER — LABETALOL HYDROCHLORIDE 200 MG/1
1 TABLET, FILM COATED ORAL
Qty: 60 | Refills: 0
Start: 2024-10-03 | End: 2024-11-01

## 2024-10-03 RX ADMIN — LABETALOL HYDROCHLORIDE 100 MILLIGRAM(S): 200 TABLET, FILM COATED ORAL at 15:36

## 2024-10-03 NOTE — ED PROVIDER NOTE - OBJECTIVE STATEMENT
Pt is  33 yo F here co lower abd pain and vaginal spotting.  pmhx signficiant for htn.  Pt states that since yesterday she has had lower abd pain and vaginal spotting.  Pt states that her last menstrual period was 8-20-24.  Pt states that she stopped her losartan and hctz last week when she found out she was pregannt. no cp. no sob. no headache. no numbness/weakness. no other complaints.

## 2024-10-03 NOTE — ED ADULT TRIAGE NOTE - CHIEF COMPLAINT QUOTE
pt had  pt had +home pregnancy test and c/o lower abd pain and vaginal spotting  and headache since yesterday LMP 24, hx preeclampsia is previous pregnancy

## 2024-10-03 NOTE — ED ADULT NURSE NOTE - OBJECTIVE STATEMENT
Pt A+Ox4, language line  used to translate # [id number]. Pt states +home pregnancy test c/o lower abd pain, vaginal spotting, and headache since yesterday. Pt states LMP was 8/20/24. Pt denies SOB, Cp, N/V/D at this time. Pt states + dizziness. Respirations are equal and unlabored on room air, pt speaking complete coherent sentences. Pt left in position of comfort, wheels of stretcher locked and in the lowest position. Call bell within reach.

## 2024-10-03 NOTE — ED ADULT NURSE NOTE - NSFALLRISKINTERV_ED_ALL_ED

## 2024-10-03 NOTE — ED PROVIDER NOTE - NSFOLLOWUPINSTRUCTIONS_ED_ALL_ED_FT
Amenaza de aborto  Threatened Miscarriage  La amenaza de aborto se produce cuando christine leti tiene hemorragia vaginal gretchen las primeras 20 semanas de embarazo, dai el embarazo no se interrumpe. Si gretchen christiane período se produce un sangrado vaginal, el médico hará pruebas para asegurarse de que la leti todavía esté embarazada. La afección de la leti puede considerarse christine amenaza de aborto si las pruebas muestran:  Que todavía está embarazada.  Que el embrión o el bebé en gestación (feto) dentro del útero sigue creciendo.  La amenaza de aborto no implica que el embarazo vaya a terminar, dai sí aumenta el riesgo de perderlo (aborto espontáneo).    ¿Cuáles son las causas?  Por lo general, se desconoce la causa de esta afección.    ¿Qué incrementa el riesgo?  Los siguientes factores pueden hacer que christine embarazada sea más propensa a sufrir un aborto espontáneo:    Ciertas enfermedades crónicas    Enfermedades que afectan al equilibrio hormonal del cuerpo, crow christine enfermedad tiroidea o síndrome del ovario poliquístico.  Diabetes.  Trastornos autoinmunitarios.  Infecciones.  Trastornos hemorrágicos.  Obesidad.  Factores de estilo de keagan    Consumir productos con tabaco o nicotina o estar expuesta al humo del tabaco.  Beber alcohol.  Consumir grandes cantidades de cafeína.  Consumo de drogas.  Problemas relacionados con las estructuras o los órganos genitales    Insuficiencia cervical. Ambrose es cuando la parte más baja del útero (antoni uterino) se abre y se hace más delgada antes de que el embarazo llegue a término.  Tener christine afección llamada síndrome de [place], que causa cicatrización en el útero o hace que el útero tenga christine estructura anormal.  Crecimientos fibrosos, llamados fibromas, en el útero.  Anormalidades congénitas. Estos son problemas que ya están presentes en el nacimiento.  Infección en el antoni del útero.  Antecedentes personales o médicos    Lesiones (traumatismos).  Stephan tenido un aborto espontáneo antes.  Ser mando de 18 años o mayor de 35 años de edad.  Exposición a sustancias nocivas del medio ambiente. Ambrose puede incluir radiación o metales pesados, crow el plomo.  Garett ciertos medicamentos.  ¿Cuáles son los signos o los síntomas?  Los síntomas de esta afección incluyen:  Sangrado o manchado vaginal, con o sin cólicos o dolor.  Dolor o cólicos leves en el abdomen.  ¿Cómo se diagnostica?    Es posible que le realicen pruebas para comprobar si aún está embarazada. Estas pruebas se realizarán si tiene sangrado, con o sin dolor, en el abdomen antes de la semana 20 de embarazo. Estos estudios incluyen:  Christine ecografía.  Un examen físico.  Medición de la frecuencia cardíaca del feto.  Pruebas de laboratorio, crow análisis de racquel, análisis de orina o hisopados para detectar christine infección.  Es posible que le diagnostiquen christine amenaza de aborto en los siguientes casos:  La ecografía muestra que el embarazo continúa.  La frecuencia cardíaca del feto es tima.  El examen físico muestra que el antoni uterino está cerrado.  Los análisis de racquel confirman que el embarazo continúa.  ¿Cómo se trata?  No se ha demostrado que ningún tratamiento evite que christine amenaza de aborto se convierta en un aborto completo. Sin embargo, los cuidados adecuados en el hogar son importantes.    Siga estas instrucciones en [place]berg casa:  Descanse mucho.  No tenga relaciones sexuales, no se georgina duchas vaginales, no se introduzca nada en la vagina, crow tampones, hasta que el médico la autorice.  No fume ni consuma drogas.  No randi alcohol.  Evite la cafeína.  Concurra a todas las visitas prenatales de seguimiento. Ambrose es importante.  Comuníquese con un médico si:  Tiene christine ligera hemorragia o manchado vaginal gretchen el embarazo.  Siente dolor o tiene cólicos abdominales.  Tiene fiebre.  Solicite ayuda de inmediato si:  Tiene un sangrado abundante que llena 2 apósitos sanitarios grandes por hora gretchen más de 2 horas.  Le salen coágulos de racquel de la vagina.  Le sale tejido de la vagina.  Tiene christine pérdida de líquido o le sale líquido a chorros por la vagina.  Siente dolor intenso en la parte baja de la espalda o cólicos intensos en el abdomen.  Tiene fiebre, escalofríos y dolor intenso en el abdomen.  Resumen  La amenaza de aborto se produce cuando christine leti tiene sangrado vaginal en algún momento de las primeras 20 semanas de embarazo, dai el embarazo no se interrumpe.  Por lo general, no se conoce la causa de la amenaza de aborto.  Entre los síntomas de esta afección, se incluyen sangrado vaginal y cólicos o dolor leve en el abdomen.  No se ha demostrado que ningún tratamiento evite que christine amenaza de aborto se convierta en un aborto completo.  Concurra a todas las visitas prenatales de seguimiento. Ambrose es importante.  Esta información no tiene crow fin reemplazar el consejo del médico. Asegúrese de hacerle al médico cualquier pregunta que tenga.

## 2024-10-03 NOTE — ED PROVIDER NOTE - CLINICAL SUMMARY MEDICAL DECISION MAKING FREE TEXT BOX
labs and imaging reviewed. Pt with threatened AB.  Pt previously stopped losartan and this is not safe in pregnancy. Pt had been on labetalol in previous pregnancies. pt given 100 mg in ER with some response.  will start 200 bid. Pt has an appt with her ob on the 14th. instructed to return for worsening pain, vomiting, worsening bleeding, or any other concerns.  Pt given a copy of all results and instructed to f/up with pcp regarding any abnormal results.

## 2024-10-03 NOTE — ED PROVIDER NOTE - PATIENT PORTAL LINK FT
You can access the FollowMyHealth Patient Portal offered by [facility] by registering at the following website: [email or web address]. By joining [facility]’s FollowMyHealth portal, you will also be able to view your health information using other applications (apps) compatible with our system.

## 2024-10-03 NOTE — ED ADULT TRIAGE NOTE - BSA (M2)
Your vitamin D is low. Please increase your daily dose of vitamin D by 2,000 units to boost your level.
1.7

## 2024-10-04 LAB
CULTURE RESULTS: SIGNIFICANT CHANGE UP
SPECIMEN SOURCE: SIGNIFICANT CHANGE UP

## 2024-12-02 ENCOUNTER — APPOINTMENT (OUTPATIENT)
Dept: MATERNAL FETAL MEDICINE | Facility: CLINIC | Age: 34
End: 2024-12-02

## 2024-12-02 ENCOUNTER — NON-APPOINTMENT (OUTPATIENT)
Age: 34
End: 2024-12-02

## 2024-12-11 ENCOUNTER — ASOB RESULT (OUTPATIENT)
Age: 34
End: 2024-12-11

## 2024-12-11 ENCOUNTER — APPOINTMENT (OUTPATIENT)
Dept: ANTEPARTUM | Facility: CLINIC | Age: 34
End: 2024-12-11
Payer: MEDICAID

## 2024-12-11 ENCOUNTER — APPOINTMENT (OUTPATIENT)
Dept: MATERNAL FETAL MEDICINE | Facility: CLINIC | Age: 34
End: 2024-12-11
Payer: MEDICAID

## 2024-12-11 VITALS
WEIGHT: 151 LBS | OXYGEN SATURATION: 99 % | SYSTOLIC BLOOD PRESSURE: 160 MMHG | BODY MASS INDEX: 26.75 KG/M2 | DIASTOLIC BLOOD PRESSURE: 108 MMHG | HEIGHT: 63 IN | HEART RATE: 68 BPM | RESPIRATION RATE: 18 BRPM

## 2024-12-11 VITALS — SYSTOLIC BLOOD PRESSURE: 148 MMHG | DIASTOLIC BLOOD PRESSURE: 100 MMHG

## 2024-12-11 DIAGNOSIS — Z98.891 ENCOUNTER FOR ROUTINE POSTPARTUM FOLLOW-UP: ICD-10-CM

## 2024-12-11 DIAGNOSIS — O09.529 SUPERVISION OF ELDERLY MULTIGRAVIDA, UNSPECIFIED TRIMESTER: ICD-10-CM

## 2024-12-11 DIAGNOSIS — Z83.3 FAMILY HISTORY OF DIABETES MELLITUS: ICD-10-CM

## 2024-12-11 DIAGNOSIS — O09.899 SUPERVISION OF OTHER HIGH RISK PREGNANCIES, UNSPECIFIED TRIMESTER: ICD-10-CM

## 2024-12-11 DIAGNOSIS — Z87.59 PERSONAL HISTORY OF OTHER COMPLICATIONS OF PREGNANCY, CHILDBIRTH AND THE PUERPERIUM: ICD-10-CM

## 2024-12-11 DIAGNOSIS — Z3A.16 16 WEEKS GESTATION OF PREGNANCY: ICD-10-CM

## 2024-12-11 DIAGNOSIS — O10.919 UNSPECIFIED PRE-EXISTING HYPERTENSION COMPLICATING PREGNANCY, UNSPECIFIED TRIMESTER: ICD-10-CM

## 2024-12-11 DIAGNOSIS — O34.219 MATERNAL CARE FOR UNSPECIFIED TYPE SCAR FROM PREVIOUS CESAREAN DELIVERY: ICD-10-CM

## 2024-12-11 PROCEDURE — 76815 OB US LIMITED FETUS(S): CPT

## 2024-12-11 PROCEDURE — 99205 OFFICE O/P NEW HI 60 MIN: CPT

## 2024-12-11 RX ORDER — LABETALOL HYDROCHLORIDE 200 MG/1
200 TABLET, FILM COATED ORAL
Qty: 2 | Refills: 1 | Status: DISCONTINUED | COMMUNITY
Start: 2024-12-11 | End: 2024-12-11

## 2024-12-11 RX ORDER — PRENATAL VIT NO.126/IRON/FOLIC 28MG-0.8MG
28-0.8 TABLET ORAL
Refills: 0 | Status: ACTIVE | COMMUNITY

## 2024-12-11 RX ORDER — LABETALOL HYDROCHLORIDE 300 MG/1
300 TABLET, FILM COATED ORAL
Qty: 1 | Refills: 4 | Status: ACTIVE | COMMUNITY
Start: 2024-12-11 | End: 1900-01-01

## 2024-12-11 RX ORDER — LABETALOL HYDROCHLORIDE 200 MG/1
200 TABLET, FILM COATED ORAL
Refills: 0 | Status: ACTIVE | COMMUNITY

## 2024-12-11 RX ORDER — ASPIRIN 81 MG
81 TABLET, DELAYED RELEASE (ENTERIC COATED) ORAL
Refills: 0 | Status: ACTIVE | COMMUNITY

## 2024-12-31 ENCOUNTER — APPOINTMENT (OUTPATIENT)
Dept: MATERNAL FETAL MEDICINE | Facility: CLINIC | Age: 34
End: 2024-12-31
Payer: MEDICAID

## 2024-12-31 ENCOUNTER — ASOB RESULT (OUTPATIENT)
Age: 34
End: 2024-12-31

## 2024-12-31 PROCEDURE — 99442: CPT | Mod: 95

## 2025-01-07 ENCOUNTER — APPOINTMENT (OUTPATIENT)
Dept: ANTEPARTUM | Facility: CLINIC | Age: 35
End: 2025-01-07
Payer: MEDICAID

## 2025-01-07 ENCOUNTER — APPOINTMENT (OUTPATIENT)
Dept: MATERNAL FETAL MEDICINE | Facility: CLINIC | Age: 35
End: 2025-01-07
Payer: MEDICAID

## 2025-01-07 ENCOUNTER — ASOB RESULT (OUTPATIENT)
Age: 35
End: 2025-01-07

## 2025-01-07 VITALS
HEART RATE: 74 BPM | SYSTOLIC BLOOD PRESSURE: 134 MMHG | WEIGHT: 151 LBS | BODY MASS INDEX: 26.75 KG/M2 | HEIGHT: 63 IN | RESPIRATION RATE: 18 BRPM | OXYGEN SATURATION: 99 % | DIASTOLIC BLOOD PRESSURE: 80 MMHG

## 2025-01-07 VITALS
DIASTOLIC BLOOD PRESSURE: 98 MMHG | SYSTOLIC BLOOD PRESSURE: 158 MMHG | HEIGHT: 63 IN | RESPIRATION RATE: 18 BRPM | OXYGEN SATURATION: 99 % | HEART RATE: 74 BPM

## 2025-01-07 VITALS — DIASTOLIC BLOOD PRESSURE: 80 MMHG | SYSTOLIC BLOOD PRESSURE: 134 MMHG

## 2025-01-07 DIAGNOSIS — M53.86 OTHER SPECIFIED DORSOPATHIES, LUMBAR REGION: ICD-10-CM

## 2025-01-07 DIAGNOSIS — O09.529 SUPERVISION OF ELDERLY MULTIGRAVIDA, UNSPECIFIED TRIMESTER: ICD-10-CM

## 2025-01-07 DIAGNOSIS — M51.369: ICD-10-CM

## 2025-01-07 DIAGNOSIS — Z3A.19 19 WEEKS GESTATION OF PREGNANCY: ICD-10-CM

## 2025-01-07 DIAGNOSIS — Z64.1 PROBLEMS RELATED TO MULTIPARITY: ICD-10-CM

## 2025-01-07 DIAGNOSIS — O34.219 MATERNAL CARE FOR UNSPECIFIED TYPE SCAR FROM PREVIOUS CESAREAN DELIVERY: ICD-10-CM

## 2025-01-07 DIAGNOSIS — O34.42 MATERNAL CARE FOR OTHER ABNORMALITIES OF CERVIX, SECOND TRIMESTER: ICD-10-CM

## 2025-01-07 PROCEDURE — 76811 OB US DETAILED SNGL FETUS: CPT

## 2025-01-07 PROCEDURE — 76817 TRANSVAGINAL US OBSTETRIC: CPT

## 2025-01-07 PROCEDURE — 99215 OFFICE O/P EST HI 40 MIN: CPT

## 2025-01-08 LAB
APTT 2H P 1:4 NP PPP: NORMAL
APTT 2H P INC PPP: NORMAL
APTT IMM NP/PRE NP PPP: NORMAL
APTT INV RATIO PPP: 27.1 SEC
BASOPHILS # BLD AUTO: 0.05 K/UL
BASOPHILS NFR BLD AUTO: 0.4 %
CREAT SPEC-SCNC: 56 MG/DL
CREAT/PROT UR: 0.2 RATIO
EOSINOPHIL # BLD AUTO: 0.22 K/UL
EOSINOPHIL NFR BLD AUTO: 1.8 %
FIBRINOGEN PPP-MCNC: 442 MG/DL
HCT VFR BLD CALC: 34.2 %
HGB BLD-MCNC: 10.6 G/DL
IMM GRANULOCYTES NFR BLD AUTO: 0.4 %
LYMPHOCYTES # BLD AUTO: 1.98 K/UL
LYMPHOCYTES NFR BLD AUTO: 16 %
MAN DIFF?: NORMAL
MCHC RBC-ENTMCNC: 28.3 PG
MCHC RBC-ENTMCNC: 31 G/DL
MCV RBC AUTO: 91.2 FL
MONOCYTES # BLD AUTO: 0.71 K/UL
MONOCYTES NFR BLD AUTO: 5.7 %
NEUTROPHILS # BLD AUTO: 9.34 K/UL
NEUTROPHILS NFR BLD AUTO: 75.7 %
NPP NORMAL POOLED PLASMA: NORMAL SECS
PLATELET # BLD AUTO: 344 K/UL
PROT UR-MCNC: 8 MG/DL
RBC # BLD: 3.75 M/UL
RBC # FLD: 16.4 %
WBC # FLD AUTO: 12.35 K/UL

## 2025-01-09 LAB
ALBUMIN SERPL ELPH-MCNC: 4 G/DL
ALP BLD-CCNC: 105 U/L
ALT SERPL-CCNC: 17 U/L
ANION GAP SERPL CALC-SCNC: 18 MMOL/L
AST SERPL-CCNC: 19 U/L
BILIRUB SERPL-MCNC: <0.2 MG/DL
BUN SERPL-MCNC: 7 MG/DL
CALCIUM SERPL-MCNC: 9.5 MG/DL
CHLORIDE SERPL-SCNC: 100 MMOL/L
CO2 SERPL-SCNC: 20 MMOL/L
CREAT SERPL-MCNC: 0.51 MG/DL
EGFR: 124 ML/MIN/1.73M2
GLUCOSE SERPL-MCNC: 43 MG/DL
LDH SERPL-CCNC: 203 U/L
POTASSIUM SERPL-SCNC: 4.5 MMOL/L
PROT SERPL-MCNC: 7 G/DL
SODIUM SERPL-SCNC: 138 MMOL/L
URATE SERPL-MCNC: 3.7 MG/DL

## 2025-01-15 ENCOUNTER — NON-APPOINTMENT (OUTPATIENT)
Age: 35
End: 2025-01-15

## 2025-01-15 ENCOUNTER — APPOINTMENT (OUTPATIENT)
Dept: CARDIOLOGY | Facility: CLINIC | Age: 35
End: 2025-01-15
Payer: MEDICAID

## 2025-01-15 VITALS — SYSTOLIC BLOOD PRESSURE: 164 MMHG | DIASTOLIC BLOOD PRESSURE: 94 MMHG

## 2025-01-15 VITALS
OXYGEN SATURATION: 99 % | SYSTOLIC BLOOD PRESSURE: 152 MMHG | HEART RATE: 69 BPM | WEIGHT: 153 LBS | HEIGHT: 63 IN | BODY MASS INDEX: 27.11 KG/M2 | DIASTOLIC BLOOD PRESSURE: 94 MMHG

## 2025-01-15 DIAGNOSIS — R00.2 PALPITATIONS: ICD-10-CM

## 2025-01-15 DIAGNOSIS — Z83.3 FAMILY HISTORY OF DIABETES MELLITUS: ICD-10-CM

## 2025-01-15 DIAGNOSIS — O10.919 UNSPECIFIED PRE-EXISTING HYPERTENSION COMPLICATING PREGNANCY, UNSPECIFIED TRIMESTER: ICD-10-CM

## 2025-01-15 DIAGNOSIS — Z87.59 PERSONAL HISTORY OF OTHER COMPLICATIONS OF PREGNANCY, CHILDBIRTH AND THE PUERPERIUM: ICD-10-CM

## 2025-01-15 DIAGNOSIS — R06.09 OTHER FORMS OF DYSPNEA: ICD-10-CM

## 2025-01-15 PROCEDURE — 93000 ELECTROCARDIOGRAM COMPLETE: CPT | Mod: 59

## 2025-01-15 PROCEDURE — T1013: CPT

## 2025-01-15 PROCEDURE — 99204 OFFICE O/P NEW MOD 45 MIN: CPT

## 2025-01-15 PROCEDURE — 93242 EXT ECG>48HR<7D RECORDING: CPT

## 2025-01-15 RX ORDER — KRILL/OM-3/DHA/EPA/PHOSPHO/AST 1000-230MG
81 CAPSULE ORAL DAILY
Qty: 30 | Refills: 3 | Status: ACTIVE | COMMUNITY
Start: 2025-01-15 | End: 1900-01-01

## 2025-01-15 RX ORDER — NIFEDIPINE 60 MG/1
60 TABLET, EXTENDED RELEASE ORAL DAILY
Qty: 30 | Refills: 3 | Status: ACTIVE | COMMUNITY
Start: 2025-01-15 | End: 1900-01-01

## 2025-01-19 NOTE — ED ADULT NURSE NOTE - CHPI ED NUR SEVERITY2
"[facility], [place]  Toñito Haas MD  Neurosurgery Resident  Pager: [phone]    Please contact neurosurgery resident on call with questions.    Dial * * *[phone], enter [phone] Pressure wire inserted and procedure begun. YOUSIF HURLEY MD         SYSTEM ID:  [id number]          MILD

## 2025-01-21 ENCOUNTER — APPOINTMENT (OUTPATIENT)
Dept: ANTEPARTUM | Facility: CLINIC | Age: 35
End: 2025-01-21

## 2025-01-29 ENCOUNTER — APPOINTMENT (OUTPATIENT)
Dept: CARDIOLOGY | Facility: CLINIC | Age: 35
End: 2025-01-29
Payer: MEDICAID

## 2025-01-29 PROCEDURE — 93306 TTE W/DOPPLER COMPLETE: CPT

## 2025-02-04 ENCOUNTER — APPOINTMENT (OUTPATIENT)
Dept: CARDIOLOGY | Facility: CLINIC | Age: 35
End: 2025-02-04

## 2025-02-05 ENCOUNTER — APPOINTMENT (OUTPATIENT)
Dept: ANTEPARTUM | Facility: CLINIC | Age: 35
End: 2025-02-05
Payer: MEDICAID

## 2025-02-05 ENCOUNTER — ASOB RESULT (OUTPATIENT)
Age: 35
End: 2025-02-05

## 2025-02-05 PROCEDURE — 76817 TRANSVAGINAL US OBSTETRIC: CPT

## 2025-02-05 PROCEDURE — 76815 OB US LIMITED FETUS(S): CPT

## 2025-02-19 ENCOUNTER — APPOINTMENT (OUTPATIENT)
Dept: ANTEPARTUM | Facility: CLINIC | Age: 35
End: 2025-02-19
Payer: MEDICAID

## 2025-02-19 ENCOUNTER — APPOINTMENT (OUTPATIENT)
Dept: MATERNAL FETAL MEDICINE | Facility: CLINIC | Age: 35
End: 2025-02-19
Payer: MEDICAID

## 2025-02-19 ENCOUNTER — ASOB RESULT (OUTPATIENT)
Age: 35
End: 2025-02-19

## 2025-02-19 VITALS
SYSTOLIC BLOOD PRESSURE: 110 MMHG | HEIGHT: 63 IN | OXYGEN SATURATION: 99 % | BODY MASS INDEX: 27.29 KG/M2 | RESPIRATION RATE: 16 BRPM | DIASTOLIC BLOOD PRESSURE: 64 MMHG | HEART RATE: 78 BPM | WEIGHT: 154 LBS

## 2025-02-19 DIAGNOSIS — O34.42 MATERNAL CARE FOR OTHER ABNORMALITIES OF CERVIX, SECOND TRIMESTER: ICD-10-CM

## 2025-02-19 DIAGNOSIS — Z3A.19 19 WEEKS GESTATION OF PREGNANCY: ICD-10-CM

## 2025-02-19 DIAGNOSIS — O10.919 UNSPECIFIED PRE-EXISTING HYPERTENSION COMPLICATING PREGNANCY, UNSPECIFIED TRIMESTER: ICD-10-CM

## 2025-02-19 DIAGNOSIS — O09.899 SUPERVISION OF OTHER HIGH RISK PREGNANCIES, UNSPECIFIED TRIMESTER: ICD-10-CM

## 2025-02-19 DIAGNOSIS — Z64.1 PROBLEMS RELATED TO MULTIPARITY: ICD-10-CM

## 2025-02-19 DIAGNOSIS — O09.529 SUPERVISION OF ELDERLY MULTIGRAVIDA, UNSPECIFIED TRIMESTER: ICD-10-CM

## 2025-02-19 DIAGNOSIS — Z87.59 PERSONAL HISTORY OF OTHER COMPLICATIONS OF PREGNANCY, CHILDBIRTH AND THE PUERPERIUM: ICD-10-CM

## 2025-02-19 DIAGNOSIS — O34.219 MATERNAL CARE FOR UNSPECIFIED TYPE SCAR FROM PREVIOUS CESAREAN DELIVERY: ICD-10-CM

## 2025-02-19 DIAGNOSIS — O44.42 LOW LYING PLACENTA NOS OR WITHOUT HEMORRHAGE, SECOND TRIMESTER: ICD-10-CM

## 2025-02-19 PROCEDURE — 76817 TRANSVAGINAL US OBSTETRIC: CPT

## 2025-02-19 PROCEDURE — 99215 OFFICE O/P EST HI 40 MIN: CPT

## 2025-02-19 PROCEDURE — 76816 OB US FOLLOW-UP PER FETUS: CPT

## 2025-03-05 ENCOUNTER — APPOINTMENT (OUTPATIENT)
Dept: ANTEPARTUM | Facility: CLINIC | Age: 35
End: 2025-03-05
Payer: MEDICAID

## 2025-03-05 ENCOUNTER — ASOB RESULT (OUTPATIENT)
Age: 35
End: 2025-03-05

## 2025-03-05 PROCEDURE — 76817 TRANSVAGINAL US OBSTETRIC: CPT

## 2025-03-05 PROCEDURE — 76816 OB US FOLLOW-UP PER FETUS: CPT

## 2025-04-02 ENCOUNTER — ASOB RESULT (OUTPATIENT)
Age: 35
End: 2025-04-02

## 2025-04-02 ENCOUNTER — APPOINTMENT (OUTPATIENT)
Dept: ANTEPARTUM | Facility: CLINIC | Age: 35
End: 2025-04-02
Payer: MEDICAID

## 2025-04-02 PROCEDURE — 76816 OB US FOLLOW-UP PER FETUS: CPT

## 2025-04-02 PROCEDURE — 76819 FETAL BIOPHYS PROFIL W/O NST: CPT

## 2025-04-02 PROCEDURE — 76817 TRANSVAGINAL US OBSTETRIC: CPT

## 2025-04-16 ENCOUNTER — APPOINTMENT (OUTPATIENT)
Dept: ANTEPARTUM | Facility: CLINIC | Age: 35
End: 2025-04-16

## 2025-04-23 ENCOUNTER — APPOINTMENT (OUTPATIENT)
Dept: ANTEPARTUM | Facility: CLINIC | Age: 35
End: 2025-04-23
Payer: MEDICAID

## 2025-04-23 ENCOUNTER — TRANSCRIPTION ENCOUNTER (OUTPATIENT)
Age: 35
End: 2025-04-23

## 2025-04-23 ENCOUNTER — ASOB RESULT (OUTPATIENT)
Age: 35
End: 2025-04-23

## 2025-04-23 ENCOUNTER — INPATIENT (INPATIENT)
Facility: HOSPITAL | Age: 35
LOS: 5 days | Discharge: HOME | DRG: 833 | End: 2025-04-29
Attending: OBSTETRICS & GYNECOLOGY | Admitting: OBSTETRICS & GYNECOLOGY
Payer: MEDICAID

## 2025-04-23 VITALS — DIASTOLIC BLOOD PRESSURE: 129 MMHG | SYSTOLIC BLOOD PRESSURE: 224 MMHG | HEART RATE: 62 BPM

## 2025-04-23 DIAGNOSIS — Z98.891 HISTORY OF UTERINE SCAR FROM PREVIOUS SURGERY: Chronic | ICD-10-CM

## 2025-04-23 DIAGNOSIS — O26.899 OTHER SPECIFIED PREGNANCY RELATED CONDITIONS, UNSPECIFIED TRIMESTER: ICD-10-CM

## 2025-04-23 DIAGNOSIS — O26.893 OTHER SPECIFIED PREGNANCY RELATED CONDITIONS, THIRD TRIMESTER: ICD-10-CM

## 2025-04-23 LAB
ALBUMIN SERPL ELPH-MCNC: 2.9 G/DL — LOW (ref 3.3–5.2)
ALP SERPL-CCNC: 190 U/L — HIGH (ref 40–120)
ALT FLD-CCNC: 19 U/L — SIGNIFICANT CHANGE UP
ANION GAP SERPL CALC-SCNC: 15 MMOL/L — SIGNIFICANT CHANGE UP (ref 5–17)
APPEARANCE UR: CLEAR — SIGNIFICANT CHANGE UP
APTT BLD: 21.7 SEC — LOW (ref 26.1–36.8)
AST SERPL-CCNC: 28 U/L — SIGNIFICANT CHANGE UP
BACTERIA # UR AUTO: ABNORMAL /HPF
BASOPHILS # BLD AUTO: 0.06 K/UL — SIGNIFICANT CHANGE UP (ref 0–0.2)
BASOPHILS # BLD AUTO: 0.07 K/UL — SIGNIFICANT CHANGE UP (ref 0–0.2)
BASOPHILS NFR BLD AUTO: 0.6 % — SIGNIFICANT CHANGE UP (ref 0–2)
BASOPHILS NFR BLD AUTO: 0.8 % — SIGNIFICANT CHANGE UP (ref 0–2)
BILIRUB SERPL-MCNC: <0.2 MG/DL — LOW (ref 0.4–2)
BILIRUB UR-MCNC: NEGATIVE — SIGNIFICANT CHANGE UP
BLD GP AB SCN SERPL QL: SIGNIFICANT CHANGE UP
BUN SERPL-MCNC: 18.9 MG/DL — SIGNIFICANT CHANGE UP (ref 8–20)
CALCIUM SERPL-MCNC: 8.6 MG/DL — SIGNIFICANT CHANGE UP (ref 8.4–10.5)
CAST: 13 /LPF — HIGH (ref 0–4)
CHLORIDE SERPL-SCNC: 102 MMOL/L — SIGNIFICANT CHANGE UP (ref 96–108)
CO2 SERPL-SCNC: 16 MMOL/L — LOW (ref 22–29)
COLOR SPEC: YELLOW — SIGNIFICANT CHANGE UP
CREAT ?TM UR-MCNC: 149 MG/DL — SIGNIFICANT CHANGE UP
CREAT SERPL-MCNC: 0.77 MG/DL — SIGNIFICANT CHANGE UP (ref 0.5–1.3)
DIFF PNL FLD: ABNORMAL
EGFR: 102 ML/MIN/1.73M2 — SIGNIFICANT CHANGE UP
EGFR: 102 ML/MIN/1.73M2 — SIGNIFICANT CHANGE UP
EOSINOPHIL # BLD AUTO: 0.08 K/UL — SIGNIFICANT CHANGE UP (ref 0–0.5)
EOSINOPHIL # BLD AUTO: 0.08 K/UL — SIGNIFICANT CHANGE UP (ref 0–0.5)
EOSINOPHIL NFR BLD AUTO: 0.9 % — SIGNIFICANT CHANGE UP (ref 0–6)
EOSINOPHIL NFR BLD AUTO: 0.9 % — SIGNIFICANT CHANGE UP (ref 0–6)
FIBRINOGEN PPP-MCNC: 426 MG/DL — SIGNIFICANT CHANGE UP (ref 200–450)
GLUCOSE SERPL-MCNC: 78 MG/DL — SIGNIFICANT CHANGE UP (ref 70–99)
GLUCOSE UR QL: NEGATIVE MG/DL — SIGNIFICANT CHANGE UP
HCT VFR BLD CALC: 30 % — LOW (ref 34.5–45)
HCT VFR BLD CALC: 31.7 % — LOW (ref 34.5–45)
HGB BLD-MCNC: 10.3 G/DL — LOW (ref 11.5–15.5)
HGB BLD-MCNC: 9.7 G/DL — LOW (ref 11.5–15.5)
HIV 1 & 2 AB SERPL IA.RAPID: SIGNIFICANT CHANGE UP
IMM GRANULOCYTES # BLD AUTO: 0.04 K/UL — SIGNIFICANT CHANGE UP (ref 0–0.07)
IMM GRANULOCYTES # BLD AUTO: 0.06 K/UL — SIGNIFICANT CHANGE UP (ref 0–0.07)
IMM GRANULOCYTES NFR BLD AUTO: 0.4 % — SIGNIFICANT CHANGE UP (ref 0–0.9)
IMM GRANULOCYTES NFR BLD AUTO: 0.6 % — SIGNIFICANT CHANGE UP (ref 0–0.9)
INR BLD: 0.8 RATIO — LOW (ref 0.85–1.16)
KETONES UR-MCNC: ABNORMAL MG/DL
LDH SERPL L TO P-CCNC: 196 U/L — HIGH (ref 98–192)
LEUKOCYTE ESTERASE UR-ACNC: NEGATIVE — SIGNIFICANT CHANGE UP
LYMPHOCYTES # BLD AUTO: 1.56 K/UL — SIGNIFICANT CHANGE UP (ref 1–3.3)
LYMPHOCYTES # BLD AUTO: 1.61 K/UL — SIGNIFICANT CHANGE UP (ref 1–3.3)
LYMPHOCYTES NFR BLD AUTO: 16.6 % — SIGNIFICANT CHANGE UP (ref 13–44)
LYMPHOCYTES NFR BLD AUTO: 17.5 % — SIGNIFICANT CHANGE UP (ref 13–44)
MCHC RBC-ENTMCNC: 26.9 PG — LOW (ref 27–34)
MCHC RBC-ENTMCNC: 27.1 PG — SIGNIFICANT CHANGE UP (ref 27–34)
MCHC RBC-ENTMCNC: 32.3 G/DL — SIGNIFICANT CHANGE UP (ref 32–36)
MCHC RBC-ENTMCNC: 32.5 G/DL — SIGNIFICANT CHANGE UP (ref 32–36)
MCV RBC AUTO: 83.3 FL — SIGNIFICANT CHANGE UP (ref 80–100)
MCV RBC AUTO: 83.4 FL — SIGNIFICANT CHANGE UP (ref 80–100)
MONOCYTES # BLD AUTO: 0.55 K/UL — SIGNIFICANT CHANGE UP (ref 0–0.9)
MONOCYTES # BLD AUTO: 0.64 K/UL — SIGNIFICANT CHANGE UP (ref 0–0.9)
MONOCYTES NFR BLD AUTO: 6 % — SIGNIFICANT CHANGE UP (ref 2–14)
MONOCYTES NFR BLD AUTO: 6.8 % — SIGNIFICANT CHANGE UP (ref 2–14)
NEUTROPHILS # BLD AUTO: 6.84 K/UL — SIGNIFICANT CHANGE UP (ref 1.8–7.4)
NEUTROPHILS # BLD AUTO: 6.99 K/UL — SIGNIFICANT CHANGE UP (ref 1.8–7.4)
NEUTROPHILS NFR BLD AUTO: 74.4 % — SIGNIFICANT CHANGE UP (ref 43–77)
NEUTROPHILS NFR BLD AUTO: 74.5 % — SIGNIFICANT CHANGE UP (ref 43–77)
NITRITE UR-MCNC: NEGATIVE — SIGNIFICANT CHANGE UP
NRBC # BLD AUTO: 0 K/UL — SIGNIFICANT CHANGE UP (ref 0–0)
NRBC # BLD AUTO: 0 K/UL — SIGNIFICANT CHANGE UP (ref 0–0)
NRBC # FLD: 0 K/UL — SIGNIFICANT CHANGE UP (ref 0–0)
NRBC # FLD: 0 K/UL — SIGNIFICANT CHANGE UP (ref 0–0)
NRBC BLD AUTO-RTO: 0 /100 WBCS — SIGNIFICANT CHANGE UP (ref 0–0)
NRBC BLD AUTO-RTO: 0 /100 WBCS — SIGNIFICANT CHANGE UP (ref 0–0)
PH UR: 6.5 — SIGNIFICANT CHANGE UP (ref 5–8)
PLATELET # BLD AUTO: 199 K/UL — SIGNIFICANT CHANGE UP (ref 150–400)
PLATELET # BLD AUTO: 201 K/UL — SIGNIFICANT CHANGE UP (ref 150–400)
PMV BLD: 11.4 FL — SIGNIFICANT CHANGE UP (ref 7–13)
PMV BLD: 11.5 FL — SIGNIFICANT CHANGE UP (ref 7–13)
POTASSIUM SERPL-MCNC: 4.5 MMOL/L — SIGNIFICANT CHANGE UP (ref 3.5–5.3)
POTASSIUM SERPL-SCNC: 4.5 MMOL/L — SIGNIFICANT CHANGE UP (ref 3.5–5.3)
PROT ?TM UR-MCNC: >400 MG/DL — HIGH (ref 0–12)
PROT SERPL-MCNC: 5.8 G/DL — LOW (ref 6.6–8.7)
PROT UR-MCNC: >=1000 MG/DL
PROT/CREAT UR-RTO: >2.7 RATIO — HIGH
PROTHROM AB SERPL-ACNC: 9.3 SEC — LOW (ref 9.9–13.4)
RBC # BLD: 3.6 M/UL — LOW (ref 3.8–5.2)
RBC # BLD: 3.8 M/UL — SIGNIFICANT CHANGE UP (ref 3.8–5.2)
RBC # FLD: 15.3 % — HIGH (ref 10.3–14.5)
RBC # FLD: 15.4 % — HIGH (ref 10.3–14.5)
RBC CASTS # UR COMP ASSIST: 1 /HPF — SIGNIFICANT CHANGE UP (ref 0–4)
SODIUM SERPL-SCNC: 133 MMOL/L — LOW (ref 135–145)
SP GR SPEC: 1.02 — SIGNIFICANT CHANGE UP (ref 1–1.03)
SQUAMOUS # UR AUTO: 10 /HPF — HIGH (ref 0–5)
URATE SERPL-MCNC: 7.7 MG/DL — HIGH (ref 2.4–5.7)
UROBILINOGEN FLD QL: 0.2 MG/DL — SIGNIFICANT CHANGE UP (ref 0.2–1)
WBC # BLD: 9.19 K/UL — SIGNIFICANT CHANGE UP (ref 3.8–10.5)
WBC # BLD: 9.39 K/UL — SIGNIFICANT CHANGE UP (ref 3.8–10.5)
WBC # FLD AUTO: 9.19 K/UL — SIGNIFICANT CHANGE UP (ref 3.8–10.5)
WBC # FLD AUTO: 9.39 K/UL — SIGNIFICANT CHANGE UP (ref 3.8–10.5)
WBC UR QL: 5 /HPF — SIGNIFICANT CHANGE UP (ref 0–5)

## 2025-04-23 PROCEDURE — 76820 UMBILICAL ARTERY ECHO: CPT

## 2025-04-23 PROCEDURE — 76818 FETAL BIOPHYS PROFILE W/NST: CPT

## 2025-04-23 PROCEDURE — 88302 TISSUE EXAM BY PATHOLOGIST: CPT | Mod: 26

## 2025-04-23 RX ORDER — OXYCODONE HYDROCHLORIDE 30 MG/1
5 TABLET ORAL ONCE
Refills: 0 | Status: DISCONTINUED | OUTPATIENT
Start: 2025-04-23 | End: 2025-04-29

## 2025-04-23 RX ORDER — IBUPROFEN 200 MG
600 TABLET ORAL EVERY 6 HOURS
Refills: 0 | Status: COMPLETED | OUTPATIENT
Start: 2025-04-23 | End: 2026-03-22

## 2025-04-23 RX ORDER — MAGNESIUM SULFATE 500 MG/ML
4 SYRINGE (ML) INJECTION ONCE
Refills: 0 | Status: COMPLETED | OUTPATIENT
Start: 2025-04-23 | End: 2025-04-23

## 2025-04-23 RX ORDER — SIMETHICONE 80 MG
80 TABLET,CHEWABLE ORAL EVERY 4 HOURS
Refills: 0 | Status: DISCONTINUED | OUTPATIENT
Start: 2025-04-23 | End: 2025-04-29

## 2025-04-23 RX ORDER — CEFAZOLIN SODIUM IN 0.9 % NACL 3 G/100 ML
2000 INTRAVENOUS SOLUTION, PIGGYBACK (ML) INTRAVENOUS ONCE
Refills: 0 | Status: COMPLETED | OUTPATIENT
Start: 2025-04-23 | End: 2025-04-23

## 2025-04-23 RX ORDER — MAGNESIUM HYDROXIDE 400 MG/5ML
30 SUSPENSION ORAL
Refills: 0 | Status: DISCONTINUED | OUTPATIENT
Start: 2025-04-23 | End: 2025-04-29

## 2025-04-23 RX ORDER — LABETALOL HYDROCHLORIDE 200 MG/1
100 TABLET, FILM COATED ORAL ONCE
Refills: 0 | Status: COMPLETED | OUTPATIENT
Start: 2025-04-23 | End: 2025-04-23

## 2025-04-23 RX ORDER — LABETALOL HYDROCHLORIDE 200 MG/1
300 TABLET, FILM COATED ORAL THREE TIMES A DAY
Refills: 0 | Status: DISCONTINUED | OUTPATIENT
Start: 2025-04-24 | End: 2025-04-24

## 2025-04-23 RX ORDER — LABETALOL HYDROCHLORIDE 200 MG/1
40 TABLET, FILM COATED ORAL ONCE
Refills: 0 | Status: COMPLETED | OUTPATIENT
Start: 2025-04-23 | End: 2025-04-23

## 2025-04-23 RX ORDER — CEFAZOLIN SODIUM IN 0.9 % NACL 3 G/100 ML
2000 INTRAVENOUS SOLUTION, PIGGYBACK (ML) INTRAVENOUS ONCE
Refills: 0 | Status: DISCONTINUED | OUTPATIENT
Start: 2025-04-23 | End: 2025-04-23

## 2025-04-23 RX ORDER — SCOPOLAMINE 1 MG/3D
1 PATCH, EXTENDED RELEASE TRANSDERMAL ONCE
Refills: 0 | Status: COMPLETED | OUTPATIENT
Start: 2025-04-23 | End: 2025-04-23

## 2025-04-23 RX ORDER — OXYTOCIN-SODIUM CHLORIDE 0.9% IV SOLN 30 UNIT/500ML 30-0.9/5 UT/ML-%
42 SOLUTION INTRAVENOUS
Qty: 30 | Refills: 0 | Status: DISCONTINUED | OUTPATIENT
Start: 2025-04-23 | End: 2025-04-29

## 2025-04-23 RX ORDER — LABETALOL HYDROCHLORIDE 200 MG/1
20 TABLET, FILM COATED ORAL ONCE
Refills: 0 | Status: COMPLETED | OUTPATIENT
Start: 2025-04-23 | End: 2025-04-23

## 2025-04-23 RX ORDER — LABETALOL HYDROCHLORIDE 200 MG/1
20 TABLET, FILM COATED ORAL ONCE
Refills: 0 | Status: DISCONTINUED | OUTPATIENT
Start: 2025-04-23 | End: 2025-04-23

## 2025-04-23 RX ORDER — LABETALOL HYDROCHLORIDE 200 MG/1
80 TABLET, FILM COATED ORAL ONCE
Refills: 0 | Status: COMPLETED | OUTPATIENT
Start: 2025-04-23 | End: 2025-04-23

## 2025-04-23 RX ORDER — ENOXAPARIN SODIUM 100 MG/ML
40 INJECTION SUBCUTANEOUS EVERY 24 HOURS
Refills: 0 | Status: DISCONTINUED | OUTPATIENT
Start: 2025-04-24 | End: 2025-04-29

## 2025-04-23 RX ORDER — PRENATAL 136/IRON/FOLIC ACID 27 MG-1 MG
1 TABLET ORAL
Refills: 0 | DISCHARGE

## 2025-04-23 RX ORDER — ACETAMINOPHEN 500 MG/5ML
975 LIQUID (ML) ORAL
Refills: 0 | Status: DISCONTINUED | OUTPATIENT
Start: 2025-04-23 | End: 2025-04-29

## 2025-04-23 RX ORDER — SODIUM CHLORIDE 9 G/1000ML
1000 INJECTION, SOLUTION INTRAVENOUS
Refills: 0 | Status: DISCONTINUED | OUTPATIENT
Start: 2025-04-23 | End: 2025-04-23

## 2025-04-23 RX ORDER — DIPHENHYDRAMINE HCL 12.5MG/5ML
25 ELIXIR ORAL EVERY 6 HOURS
Refills: 0 | Status: DISCONTINUED | OUTPATIENT
Start: 2025-04-23 | End: 2025-04-29

## 2025-04-23 RX ORDER — OXYCODONE HYDROCHLORIDE 30 MG/1
5 TABLET ORAL
Refills: 0 | Status: DISCONTINUED | OUTPATIENT
Start: 2025-04-23 | End: 2025-04-29

## 2025-04-23 RX ORDER — LABETALOL HYDROCHLORIDE 200 MG/1
200 TABLET, FILM COATED ORAL EVERY 12 HOURS
Refills: 0 | Status: DISCONTINUED | OUTPATIENT
Start: 2025-04-23 | End: 2025-04-23

## 2025-04-23 RX ORDER — KETOROLAC TROMETHAMINE 30 MG/ML
30 INJECTION, SOLUTION INTRAMUSCULAR; INTRAVENOUS EVERY 6 HOURS
Refills: 0 | Status: DISCONTINUED | OUTPATIENT
Start: 2025-04-23 | End: 2025-04-24

## 2025-04-23 RX ORDER — CITRIC ACID/SODIUM CITRATE 300-500 MG
30 SOLUTION, ORAL ORAL ONCE
Refills: 0 | Status: COMPLETED | OUTPATIENT
Start: 2025-04-23 | End: 2025-04-23

## 2025-04-23 RX ORDER — CLOSTRIDIUM TETANI TOXOID ANTIGEN (FORMALDEHYDE INACTIVATED), CORYNEBACTERIUM DIPHTHERIAE TOXOID ANTIGEN (FORMALDEHYDE INACTIVATED), BORDETELLA PERTUSSIS TOXOID ANTIGEN (GLUTARALDEHYDE INACTIVATED), BORDETELLA PERTUSSIS FILAMENTOUS HEMAGGLUTININ ANTIGEN (FORMALDEHYDE INACTIVATED), BORDETELLA PERTUSSIS PERTACTIN ANTIGEN, AND BORDETELLA PERTUSSIS FIMBRIAE 2/3 ANTIGEN 5; 2; 2.5; 5; 3; 5 [LF]/.5ML; [LF]/.5ML; UG/.5ML; UG/.5ML; UG/.5ML; UG/.5ML
0.5 INJECTION, SUSPENSION INTRAMUSCULAR ONCE
Refills: 0 | Status: DISCONTINUED | OUTPATIENT
Start: 2025-04-23 | End: 2025-04-29

## 2025-04-23 RX ORDER — TRANEXAMIC ACID 1000 MG/10
1000 AMPUL (ML) INTRAVENOUS ONCE
Refills: 0 | Status: COMPLETED | OUTPATIENT
Start: 2025-04-23 | End: 2025-04-23

## 2025-04-23 RX ORDER — MAGNESIUM SULFATE 500 MG/ML
2 SYRINGE (ML) INJECTION
Qty: 40 | Refills: 0 | Status: DISCONTINUED | OUTPATIENT
Start: 2025-04-23 | End: 2025-04-29

## 2025-04-23 RX ORDER — ACETAMINOPHEN 500 MG/5ML
975 LIQUID (ML) ORAL ONCE
Refills: 0 | Status: COMPLETED | OUTPATIENT
Start: 2025-04-23 | End: 2025-04-23

## 2025-04-23 RX ORDER — SODIUM CHLORIDE 9 G/1000ML
1000 INJECTION, SOLUTION INTRAVENOUS
Refills: 0 | Status: DISCONTINUED | OUTPATIENT
Start: 2025-04-23 | End: 2025-04-29

## 2025-04-23 RX ORDER — MODIFIED LANOLIN 100 %
1 CREAM (GRAM) TOPICAL EVERY 6 HOURS
Refills: 0 | Status: DISCONTINUED | OUTPATIENT
Start: 2025-04-23 | End: 2025-04-29

## 2025-04-23 RX ADMIN — Medication 975 MILLIGRAM(S): at 17:43

## 2025-04-23 RX ADMIN — Medication 975 MILLIGRAM(S): at 21:12

## 2025-04-23 RX ADMIN — Medication 20 MILLIGRAM(S): at 17:33

## 2025-04-23 RX ADMIN — Medication 300 GRAM(S): at 18:01

## 2025-04-23 RX ADMIN — LABETALOL HYDROCHLORIDE 100 MILLIGRAM(S): 200 TABLET, FILM COATED ORAL at 22:33

## 2025-04-23 RX ADMIN — Medication 300 GRAM(S): at 17:18

## 2025-04-23 RX ADMIN — Medication 1 APPLICATION(S): at 17:34

## 2025-04-23 RX ADMIN — Medication 50 GM/HR: at 17:40

## 2025-04-23 RX ADMIN — LABETALOL HYDROCHLORIDE 40 MILLIGRAM(S): 200 TABLET, FILM COATED ORAL at 20:59

## 2025-04-23 RX ADMIN — Medication 10 MILLIGRAM(S): at 17:36

## 2025-04-23 RX ADMIN — LABETALOL HYDROCHLORIDE 20 MILLIGRAM(S): 200 TABLET, FILM COATED ORAL at 20:36

## 2025-04-23 RX ADMIN — LABETALOL HYDROCHLORIDE 80 MILLIGRAM(S): 200 TABLET, FILM COATED ORAL at 21:31

## 2025-04-23 RX ADMIN — OXYTOCIN-SODIUM CHLORIDE 0.9% IV SOLN 30 UNIT/500ML 42 MILLIUNIT(S)/MIN: 30-0.9/5 SOLUTION at 18:48

## 2025-04-23 RX ADMIN — LABETALOL HYDROCHLORIDE 200 MILLIGRAM(S): 200 TABLET, FILM COATED ORAL at 21:13

## 2025-04-23 RX ADMIN — LABETALOL HYDROCHLORIDE 20 MILLIGRAM(S): 200 TABLET, FILM COATED ORAL at 18:01

## 2025-04-23 RX ADMIN — Medication 5 MILLIGRAM(S): at 22:12

## 2025-04-23 RX ADMIN — Medication 2000 MILLIGRAM(S): at 18:47

## 2025-04-23 RX ADMIN — Medication 30 MILLILITER(S): at 17:33

## 2025-04-23 RX ADMIN — KETOROLAC TROMETHAMINE 30 MILLIGRAM(S): 30 INJECTION, SOLUTION INTRAMUSCULAR; INTRAVENOUS at 19:15

## 2025-04-23 RX ADMIN — Medication 10 MILLIGRAM(S): at 17:12

## 2025-04-23 RX ADMIN — Medication 220 MILLIGRAM(S): at 18:30

## 2025-04-23 RX ADMIN — SCOPOLAMINE 1 PATCH: 1 PATCH, EXTENDED RELEASE TRANSDERMAL at 17:43

## 2025-04-23 NOTE — OB RN DELIVERY SUMMARY - NSSELHIDDEN_OBGYN_ALL_OB_FT
[NS_DeliveryAttending1_OBGYN_ALL_OB_FT:[id number]],[NS_DeliveryRN_OBGYN_ALL_OB_FT:[id number]=]

## 2025-04-23 NOTE — OB PROVIDER H&P - NSLOWPPHRISK_OBGYN_A_OB
Stoo Pregnancy/Less than or equal to 4 previous vaginal births/No known bleeding disorder/No history of postpartum hemorrhage

## 2025-04-23 NOTE — OB RN DELIVERY SUMMARY - NS_TRUEKNOTA_OBGYN_ALL_OB
Dr. David Love- do you want to rx external pulmonary rehab for pt?
Gelacio Suárez requesting orders for Pulmonary rehab to take place at [facility].  Please fax orders to [phone] attn: Deb Richmond. For additional questions please call. Thank you.
Pulmonary rehab rx & pt reg facesheet faxed to Emir Do at [facility] @ #[phone]. Conf rcvd.
yes
None

## 2025-04-23 NOTE — OB PROVIDER H&P - PRO BLOOD TYPE INFANT
Regarding: Influenza Questions  ----- Message from Williams Calhoun sent at 3/3/2018  8:12 AM CST -----  Patient Name: Magui Martin  Specialist or PCP:Dr. Meredith Lowe  Pregnant (If Yes, how long?):No  Symptoms:Influenza A Questions, patient came in contact with someone with Influenza A  Call Back #:[phone]  Is the patient’s permanent residence located in [place], [place], or a [facility]? Yes [place] [address]  Call Center Account #:[id number]       O positive

## 2025-04-23 NOTE — OB PROVIDER DELIVERY SUMMARY - NSSELHIDDEN_OBGYN_ALL_OB_FT
[NS_DeliveryAttending1_OBGYN_ALL_OB_FT:[id number]],[NS_DeliveryRN_OBGYN_ALL_OB_FT:[id number]],[NS_DeliveryAssist1_OBGYN_ALL_OB_FT:[id number]]

## 2025-04-23 NOTE — OB RN INTRAOPERATIVE NOTE - NSSELHIDDEN_OBGYN_ALL_OB_FT
[NS_DeliveryAttending1_OBGYN_ALL_OB_FT:[id number]],[NS_DeliveryRN_OBGYN_ALL_OB_FT:[id number]]

## 2025-04-23 NOTE — OB PROVIDER DELIVERY SUMMARY - NSPROVIDERDELIVERYNOTE_OBGYN_ALL_OB_FT
Pt taken to the OR for rpt C/S due to cHTNsiPEC.  Not in labor.  Spinal anesthesia.  IVF 500cc  UOP 50cc  QBL 229cc    Delivered live male infant, vtx, through moderate amount of clear amniotic fluid.  no nuchal cord  Uterus wnl - adhesive disease posterior uterus - ovaries not visualized - portion tueb visualized.  Hysterotomy was reapproximated with suture, excellent hemostasis obtained.  Bilateral tubal ligation performed sutures ligated - segments sent to pathology  Fascia were reapproximated with suture, excellent hemostasis obtained.  skin closed with monocryl subcuticular fashion     weight 2300g  Skin-to-skin initiated in OR and continued into RR.  APGAR 9/9.     No intraoperative complications  Dictation# Pt taken to the OR for rpt C/S due to cHTNsiPEC.  Not in labor.  Spinal anesthesia.  IVF 500cc  UOP 50cc  QBL 229cc    Delivered live male infant, vtx, through moderate amount of clear amniotic fluid.  no nuchal cord  Uterus wnl - adhesive disease posterior uterus - ovaries not visualized - portion tueb visualized.  Hysterotomy was reapproximated with suture, excellent hemostasis obtained.  Bilateral tubal ligation performed sutures ligated - segments sent to pathology  Fascia were reapproximated with suture, excellent hemostasis obtained.  skin closed with monocryl subcuticular fashion     weight 2300g  Skin-to-skin initiated in OR and continued into RR.  APGAR 9/9.     No intraoperative complications  Dictation#[id number] Pt taken to the OR for rpt C/S due to cHTNsiPEC.  Not in labor.  Spinal anesthesia.  IVF 500cc  UOP 50cc  QBL 229cc    Delivered live male infant, vtx, through moderate amount of clear amniotic fluid.  no nuchal cord  Uterus wnl - adhesive disease posterior uterus - ovaries not visualized - portion tube visualized.  Hysterotomy was reapproximated with suture, excellent hemostasis obtained.  Bilateral tubal ligation performed via [place] method with distal and proximal segments suture ligated - segments sent to pathology  Fascia were reapproximated with suture, excellent hemostasis obtained.  skin closed with monocryl subcuticular fashion     weight 2300g  Skin-to-skin initiated in OR and continued into RR.  APGAR 9/9.     No intraoperative complications  Dictation#[id number]    Attending: patient and her partner are aware that salpingectomy could not be performed due to dense and vascular adhesions.

## 2025-04-23 NOTE — OB PROVIDER DELIVERY SUMMARY - NSLOWPPHRISK_OBGYN_A_OB
[place] Pregnancy/Less than or equal to 4 previous vaginal births/No known bleeding disorder/No history of postpartum hemorrhage/No other PPH risks indicated

## 2025-04-23 NOTE — OB NEONATOLOGY/PEDIATRICIAN DELIVERY SUMMARY - NSPEDSNEONOTESA_OBGYN_ALL_OB_FT
Baby is a 2300 gm product of a 35.0 week gestation born to a   35 year old female with LMP 24 and EDC 25. Maternal labs include blood type O+, Rub immune , RPR NR, Hep B neg, GBS unk, HIV negative. Maternal history is significant for chronic HTN. Pregnancy was complicated by  pre-eclampsia with severe features. No labor. Delivery was via unscheduled repeat  in the setting of pre-eclampsia with severe features. ROM at delivery. No complications during delivery. Resuscitation included: w/d/s/s.  Apgars were: 8/9. EOS score n/a. Admit to NBN.

## 2025-04-23 NOTE — CHART NOTE - NSCHARTNOTEFT_GEN_A_CORE
Patient meeting protocol for push of IV antihypertensive - IV labetalol 20mg pushed. Cycle BPs and follow hypertensive protocol.

## 2025-04-23 NOTE — OB PROVIDER H&P - NSHPPHYSICALEXAM_GEN_ALL_CORE
T(C): 36.8 (04-23-25 @ 17:27), Max: 36.8 (04-23-25 @ 17:27)  HR: 82 (04-23-25 @ 17:44) (62 - 83)  BP: 192/94 (04-23-25 @ 17:44) (192/94 - 268/138)  RR: 18 (04-23-25 @ 17:27) (18 - 18)    Gen: NAD, well-appearing, AAOx3   Abd: Soft, gravid  Ext: non-tender, non-edematous    FHT: baseline FHR 150s, moderate variability, -accels, -decels  [place]:  uterine irritability

## 2025-04-23 NOTE — OB PROVIDER H&P - ATTENDING COMMENTS
35y  at 35w GA presents to L&D for severe pre-eclampsia with /110, P/C ration 10, normal coagulation profile for repeat . Consent signed for  and tubal after questions answered. Will stabilize BP, anesthesia consult re drip in light of plan for spinal anesthesia. FHT reassuring.

## 2025-04-23 NOTE — OB PROVIDER H&P - HISTORY OF PRESENT ILLNESS
35y  at 35w GA presents to L&D for elevated BPs in the office. Patient reports she has had a headache since yesterday. Says the headache has improved today but is still present. Reports mild swelling in legs that started yesterday. Patient denies visual disturbances, RUQ pain, epigastric pain. Patient denies vaginal bleeding, contractions and leakage of fluid. She endorses good fetal movement. Denies fevers, chills, nausea, vomiting, chest pain, SOB, dizziness. No other complaints at this time.     Prenatal course is significant for:  cHTN  Hx of PEC requiring delivery at 30w  AMA    POB:   G1: CS due to twin gestation  G2: CS at 30w due to PEC  PGYN: -fibroids, -ovarian cysts, denies STD hx, denies abnormal PAPs   PMH: Denies  PSH: CSx2, LEEP  SH: Denies EtOH, tobacco and illicit drug use during this pregnancy; feels safe at home   Meds: PNVs, Labetaolol 100mg BID  Allergies: NKDA

## 2025-04-23 NOTE — OB PROVIDER H&P - ASSESSMENT
35y  at 35w GA presents to L&D for elevated BPs in the office.     A/P:   -Admit to L&D for PECwSF.  -Patient noted to have severe range BPs requiring Hydralazine 10/10 and Labetalol 20.  -Magnesium infusion started.  -PIH labs wnl, pending P:C  -Consent and prepare for OR for rCS.  -Fetus: Cat I tracing. Continuous toco and fetal monitoring.   -Analgesia: as per anesthesia    Discussed with Dr. Sneed

## 2025-04-23 NOTE — OB RN DELIVERY SUMMARY - NS_SEPSISRSKCALC_OBGYN_ALL_OB_FT
No temperature has been documented for this patient in CPN or on the OB Flowsheet. Ensure the highest temperature during labor was documented on the OB Flowsheet.  No gestational age at birth has been documented. Ensure delivery date/time has been entered above.  Rupture of membranes must be entered above.   EOS calculated successfully. EOS Risk Factor: 0.5/1000 live births ([facility] national incidence); GA=35w;Temp=98.2; ROM=0; GBS='Unknown'; Antibiotics='No antibiotics or any antibiotics < 2 hrs prior to birth'

## 2025-04-24 ENCOUNTER — TRANSCRIPTION ENCOUNTER (OUTPATIENT)
Age: 35
End: 2025-04-24

## 2025-04-24 LAB
ALBUMIN SERPL ELPH-MCNC: 2.5 G/DL — LOW (ref 3.3–5.2)
ALP SERPL-CCNC: 160 U/L — HIGH (ref 40–120)
ALT FLD-CCNC: 16 U/L — SIGNIFICANT CHANGE UP
ANION GAP SERPL CALC-SCNC: 15 MMOL/L — SIGNIFICANT CHANGE UP (ref 5–17)
AST SERPL-CCNC: 27 U/L — SIGNIFICANT CHANGE UP
BASOPHILS # BLD AUTO: 0.03 K/UL — SIGNIFICANT CHANGE UP (ref 0–0.2)
BASOPHILS NFR BLD AUTO: 0.2 % — SIGNIFICANT CHANGE UP (ref 0–2)
BILIRUB SERPL-MCNC: <0.2 MG/DL — LOW (ref 0.4–2)
BUN SERPL-MCNC: 18 MG/DL — SIGNIFICANT CHANGE UP (ref 8–20)
CALCIUM SERPL-MCNC: 7.1 MG/DL — LOW (ref 8.4–10.5)
CHLORIDE SERPL-SCNC: 100 MMOL/L — SIGNIFICANT CHANGE UP (ref 96–108)
CO2 SERPL-SCNC: 15 MMOL/L — LOW (ref 22–29)
CREAT SERPL-MCNC: 0.59 MG/DL — SIGNIFICANT CHANGE UP (ref 0.5–1.3)
EGFR: 119 ML/MIN/1.73M2 — SIGNIFICANT CHANGE UP
EGFR: 119 ML/MIN/1.73M2 — SIGNIFICANT CHANGE UP
EOSINOPHIL # BLD AUTO: 0.01 K/UL — SIGNIFICANT CHANGE UP (ref 0–0.5)
EOSINOPHIL NFR BLD AUTO: 0.1 % — SIGNIFICANT CHANGE UP (ref 0–6)
GLUCOSE SERPL-MCNC: 122 MG/DL — HIGH (ref 70–99)
HCT VFR BLD CALC: 29.2 % — LOW (ref 34.5–45)
HGB BLD-MCNC: 9.2 G/DL — LOW (ref 11.5–15.5)
HIV 1+2 AB+HIV1 P24 AG SERPL QL IA: SIGNIFICANT CHANGE UP
IMM GRANULOCYTES # BLD AUTO: 0.12 K/UL — HIGH (ref 0–0.07)
IMM GRANULOCYTES NFR BLD AUTO: 0.8 % — SIGNIFICANT CHANGE UP (ref 0–0.9)
LYMPHOCYTES # BLD AUTO: 1.12 K/UL — SIGNIFICANT CHANGE UP (ref 1–3.3)
LYMPHOCYTES NFR BLD AUTO: 7.3 % — LOW (ref 13–44)
MAGNESIUM SERPL-MCNC: 5.8 MG/DL — HIGH (ref 1.6–2.6)
MAGNESIUM SERPL-MCNC: 6.8 MG/DL — HIGH (ref 1.6–2.6)
MAGNESIUM SERPL-MCNC: 7 MG/DL — CRITICAL HIGH (ref 1.8–2.6)
MCHC RBC-ENTMCNC: 26.7 PG — LOW (ref 27–34)
MCHC RBC-ENTMCNC: 31.5 G/DL — LOW (ref 32–36)
MCV RBC AUTO: 84.9 FL — SIGNIFICANT CHANGE UP (ref 80–100)
MEV IGG SER-ACNC: 9.35 AU/ML — SIGNIFICANT CHANGE UP
MEV IGG+IGM SER-IMP: NEGATIVE — SIGNIFICANT CHANGE UP
MONOCYTES # BLD AUTO: 0.27 K/UL — SIGNIFICANT CHANGE UP (ref 0–0.9)
MONOCYTES NFR BLD AUTO: 1.8 % — LOW (ref 2–14)
NEUTROPHILS # BLD AUTO: 13.82 K/UL — HIGH (ref 1.8–7.4)
NEUTROPHILS NFR BLD AUTO: 89.8 % — HIGH (ref 43–77)
NRBC # BLD AUTO: 0 K/UL — SIGNIFICANT CHANGE UP (ref 0–0)
NRBC # FLD: 0 K/UL — SIGNIFICANT CHANGE UP (ref 0–0)
NRBC BLD AUTO-RTO: 0 /100 WBCS — SIGNIFICANT CHANGE UP (ref 0–0)
PLATELET # BLD AUTO: 186 K/UL — SIGNIFICANT CHANGE UP (ref 150–400)
PMV BLD: 12.1 FL — SIGNIFICANT CHANGE UP (ref 7–13)
POTASSIUM SERPL-MCNC: 4.7 MMOL/L — SIGNIFICANT CHANGE UP (ref 3.5–5.3)
POTASSIUM SERPL-SCNC: 4.7 MMOL/L — SIGNIFICANT CHANGE UP (ref 3.5–5.3)
PROT SERPL-MCNC: 5.1 G/DL — LOW (ref 6.6–8.7)
RBC # BLD: 3.44 M/UL — LOW (ref 3.8–5.2)
RBC # FLD: 15.6 % — HIGH (ref 10.3–14.5)
SODIUM SERPL-SCNC: 130 MMOL/L — LOW (ref 135–145)
T PALLIDUM AB TITR SER: NEGATIVE — SIGNIFICANT CHANGE UP
WBC # BLD: 15.37 K/UL — HIGH (ref 3.8–10.5)
WBC # FLD AUTO: 15.37 K/UL — HIGH (ref 3.8–10.5)

## 2025-04-24 RX ORDER — NIFEDIPINE 30 MG
20 TABLET, EXTENDED RELEASE 24 HR ORAL ONCE
Refills: 0 | Status: COMPLETED | OUTPATIENT
Start: 2025-04-24 | End: 2025-04-24

## 2025-04-24 RX ORDER — NIFEDIPINE 30 MG
10 TABLET, EXTENDED RELEASE 24 HR ORAL ONCE
Refills: 0 | Status: COMPLETED | OUTPATIENT
Start: 2025-04-24 | End: 2025-04-24

## 2025-04-24 RX ORDER — NIFEDIPINE 30 MG
30 TABLET, EXTENDED RELEASE 24 HR ORAL DAILY
Refills: 0 | Status: DISCONTINUED | OUTPATIENT
Start: 2025-04-24 | End: 2025-04-24

## 2025-04-24 RX ORDER — LABETALOL HYDROCHLORIDE 200 MG/1
100 TABLET, FILM COATED ORAL ONCE
Refills: 0 | Status: COMPLETED | OUTPATIENT
Start: 2025-04-24 | End: 2025-04-24

## 2025-04-24 RX ORDER — IBUPROFEN 200 MG
600 TABLET ORAL EVERY 6 HOURS
Refills: 0 | Status: DISCONTINUED | OUTPATIENT
Start: 2025-04-24 | End: 2025-04-29

## 2025-04-24 RX ORDER — LABETALOL HYDROCHLORIDE 200 MG/1
400 TABLET, FILM COATED ORAL EVERY 8 HOURS
Refills: 0 | Status: DISCONTINUED | OUTPATIENT
Start: 2025-04-25 | End: 2025-04-27

## 2025-04-24 RX ORDER — NIFEDIPINE 30 MG
60 TABLET, EXTENDED RELEASE 24 HR ORAL DAILY
Refills: 0 | Status: DISCONTINUED | OUTPATIENT
Start: 2025-04-25 | End: 2025-04-26

## 2025-04-24 RX ORDER — NIFEDIPINE 30 MG
30 TABLET, EXTENDED RELEASE 24 HR ORAL ONCE
Refills: 0 | Status: COMPLETED | OUTPATIENT
Start: 2025-04-24 | End: 2025-04-24

## 2025-04-24 RX ADMIN — Medication 975 MILLIGRAM(S): at 14:17

## 2025-04-24 RX ADMIN — Medication 10 MILLIGRAM(S): at 23:08

## 2025-04-24 RX ADMIN — KETOROLAC TROMETHAMINE 30 MILLIGRAM(S): 30 INJECTION, SOLUTION INTRAMUSCULAR; INTRAVENOUS at 03:08

## 2025-04-24 RX ADMIN — Medication 10 MILLIGRAM(S): at 00:26

## 2025-04-24 RX ADMIN — LABETALOL HYDROCHLORIDE 100 MILLIGRAM(S): 200 TABLET, FILM COATED ORAL at 23:28

## 2025-04-24 RX ADMIN — Medication 600 MILLIGRAM(S): at 17:58

## 2025-04-24 RX ADMIN — ENOXAPARIN SODIUM 40 MILLIGRAM(S): 100 INJECTION SUBCUTANEOUS at 06:14

## 2025-04-24 RX ADMIN — LABETALOL HYDROCHLORIDE 300 MILLIGRAM(S): 200 TABLET, FILM COATED ORAL at 22:50

## 2025-04-24 RX ADMIN — LABETALOL HYDROCHLORIDE 300 MILLIGRAM(S): 200 TABLET, FILM COATED ORAL at 14:18

## 2025-04-24 RX ADMIN — Medication 975 MILLIGRAM(S): at 09:10

## 2025-04-24 RX ADMIN — Medication 975 MILLIGRAM(S): at 21:30

## 2025-04-24 RX ADMIN — Medication 30 MILLIGRAM(S): at 06:14

## 2025-04-24 RX ADMIN — Medication 20 MILLIGRAM(S): at 00:53

## 2025-04-24 RX ADMIN — KETOROLAC TROMETHAMINE 30 MILLIGRAM(S): 30 INJECTION, SOLUTION INTRAMUSCULAR; INTRAVENOUS at 12:04

## 2025-04-24 RX ADMIN — LABETALOL HYDROCHLORIDE 300 MILLIGRAM(S): 200 TABLET, FILM COATED ORAL at 06:14

## 2025-04-24 RX ADMIN — Medication 30 MILLIGRAM(S): at 18:47

## 2025-04-24 NOTE — CHART NOTE - NSCHARTNOTEFT_GEN_A_CORE
Patient with diagnosis of cHTN now with superimposed preeclampsia. Currently on MgSO4.   Prior to delivery she received Hydralazine 10mg/10mg and labetalol 20mg IVP    In the recovery room she has required additional labetalol 20/40/80 and Hydralazine 5mg IVP  Her BP medications were titrated up to labetalol 300mg TID. Additional 100mg Labetalol administered as was not due for dose yet.     Now hitting criteria for another antihypertensive due to severe range blood pressures. Procardia 10IR administered and procardia 30XL added to patient's regimen.  Continue to monitor blood pressures. Patient with diagnosis of cHTN now with superimposed preeclampsia. Currently on MgSO4.   Prior to delivery she received Hydralazine 10mg/10mg and labetalol 20mg IVP    In the recovery room she has required additional labetalol 20/40/80 and Hydralazine 5mg IVP  Her BP medications were titrated up to labetalol 300mg TID. Additional 100mg Labetalol administered as was not due for dose yet.     Now hitting criteria for another antihypertensive due to severe range blood pressures. Procardia 10IR administered and procardia 30XL added to patient's regimen.  Continue to monitor blood pressures.    Update 0122:  Patient met criteria again for rapid acting medication per hypertensive protocol. Additional Procardia 20 IR administered.  Repeat blood pressures have been normotensive. Most recent 118/78.  Continue to monitor in the recovery room on L&D following hypertensive protocol.    D/w Dr. Sneed      Total BP medications:  4/23:  Hydralazine 5/10/10  Labetalol 20/20/40/80  4/24:  Procardia IR 10/20    Standing medications: Labetalol 300mg TID + Procardia 30XL  If BPs in the morning trending upwards can consider additional procardia 30XL to make it 60XL daily or uptitrate to labetalol 400mg TID Patient with diagnosis of cHTN now with superimposed preeclampsia. Currently on MgSO4.   Prior to delivery she received Hydralazine 10mg/10mg and labetalol 20mg IVP    In the recovery room she has required additional labetalol 20/40/80 and Hydralazine 5mg IVP  Her BP medications were titrated up to labetalol 300mg TID. Additional 100mg Labetalol administered as was not due for dose yet.     Now hitting criteria for another antihypertensive due to severe range blood pressures. Procardia 10IR administered and procardia 30XL added to patient's regimen.  Continue to monitor blood pressures.    Update 0122:  Patient met criteria again for rapid acting medication per hypertensive protocol. Additional Procardia 20 IR administered.  Repeat blood pressures have been normotensive. Most recent 118/78.  Continue to monitor in the recovery room on L&D following hypertensive protocol.    D/w Dr. Sneed      Total BP medications:  4/23:  Hydralazine 5/10/10  Labetalol 20/20/40/80  4/24:  Procardia IR 10/20    Standing medications: Labetalol 300mg TID + Procardia 30XL  If BPs in the morning trending upwards can consider additional procardia 30XL to make it 60XL daily or uptitrate to labetalol 400mg TID    Update 0255:  BPs have been normotensive over the last 2 hours in recovery room. Plan to move up to postpartum. Plan to continue procardia 30XL and labetalol 300 TID and monitor vitals q4h/adjust medications PRN.

## 2025-04-24 NOTE — DISCHARGE NOTE OB - HOSPITAL COURSE
Patient underwent a repeat  delivery in the setting of chronic hypertension with superimposed pre-eclampsia with severe features. Post-op course was significant for uptrending blood pressures. Patient's medication was titrated upwards such that she is now being discharged on labetalol 400mg TID and Procardia XL 60mg. Pain is well controlled with PRN medication. She has no difficulty with ambulation, voiding, or PO intake. Lab values and vital signs are within normal limits prior to discharge.   Patient underwent a repeat  delivery in the setting of chronic hypertension with superimposed pre-eclampsia with severe features. Post-op course was significant for uptrending blood pressures. Patient's medication was titrated upwards such that she is now being discharged on labetalol 400mg TID and Procardia XL 90mg. Pain is well controlled with PRN medication. She has no difficulty with ambulation, voiding, or PO intake. Lab values and vital signs are within normal limits prior to discharge.   Patient underwent a repeat  delivery in the setting of chronic hypertension with superimposed pre-eclampsia with severe features. Post-op course was significant for uptrending blood pressures. Patient's medication was titrated upwards such that she is now being discharged on labetalol 400mg TID and Procardia XL 90mg, increased to labetalol 600 TID and Procardia 120 QD. Pain is well controlled with PRN medication. She has no difficulty with ambulation, voiding, or PO intake. Lab values and vital signs are within normal limits prior to discharge.

## 2025-04-24 NOTE — DISCHARGE NOTE OB - CARE PROVIDER_API CALL
Janeth Choe  Obstetrics and Gynecology  [address] [place] [id number]  Phone: [phone]  Fax: [phone]  Follow Up Time:

## 2025-04-24 NOTE — DISCHARGE NOTE OB - CARE PLAN
1 Principal Discharge DX:	Status post repeat low transverse  section  Assessment and plan of treatment:	Patient had  section. Hospital course was uncomplicated. Patient should plan to make an appointment with the office within 1 week for an incision check and 6 weeks for postpartum follow-up. Postpartum precautions were given at the time of discharge.  Secondary Diagnosis:	Chronic hypertension with superimposed preeclampsia  Assessment and plan of treatment:	1) Take your blood pressure medications as prescribed at home  2) Before taking your medication, take your blood pressure. If your systolic (top number) blood pressure is less than 110 or your heartrate is less than 60, skip that dose  3) Make a follow up appointment with your doctor within a week for a blood pressure check  4) Come back to labor and delivery if you experience a headache that is not relieved with pain medication, changes in your vision, severe abdominal pain in the upper right part of the belly

## 2025-04-24 NOTE — DISCHARGE NOTE OB - PLAN OF CARE
1) Take your blood pressure medications as prescribed at home  2) Before taking your medication, take your blood pressure. If your systolic (top number) blood pressure is less than 110 or your heartrate is less than 60, skip that dose  3) Make a follow up appointment with your doctor within a week for a blood pressure check  4) Come back to labor and delivery if you experience a headache that is not relieved with pain medication, changes in your vision, severe abdominal pain in the upper right part of the belly Patient had  section. Hospital course was uncomplicated. Patient should plan to make an appointment with the office within 1 week for an incision check and 6 weeks for postpartum follow-up. Postpartum precautions were given at the time of discharge.

## 2025-04-24 NOTE — DISCHARGE NOTE OB - MEDICATION SUMMARY - MEDICATIONS TO TAKE
I will START or STAY ON the medications listed below when I get home from the hospital:    ibuprofen 600 mg oral tablet  -- 1 tab(s) by mouth every 6 hours  -- Indication: For Pain    Tylenol 325 mg oral tablet  -- 3 tab(s) by mouth every 6 hours  -- Indication: For Pain    labetalol 400 mg oral tablet  -- 1 tab(s) by mouth 3 times a day  -- Indication: For HTN    Procardia XL 60 mg oral tablet, extended release  -- 1 tab(s) orally  -- Indication: For HTN    Prenatal 1 Plus 1 oral tablet  -- 1 tab(s) by mouth once a day  -- Indication: For postpartum supplementation    I will START or STAY ON the medications listed below when I get home from the hospital:    Blood pressure cuff  -- Monitor blood pressures twice daily, if systolic>140 or diastolic>90 to contact OB provider  -- Indication: For Monitor blood pressures    ibuprofen 600 mg oral tablet  -- 1 tab(s) by mouth every 6 hours  -- Indication: For Pain    Tylenol 325 mg oral tablet  -- 3 tab(s) by mouth every 6 hours  -- Indication: For Pain    labetalol 400 mg oral tablet  -- 1 tab(s) by mouth 3 times a day  -- Indication: For HTN    Procardia XL 60 mg oral tablet, extended release  -- 1 tab(s) by mouth once a day  -- Indication: For HTN    Prenatal 1 Plus 1 oral tablet  -- 1 tab(s) by mouth once a day  -- Indication: For postpartum supplementation    I will START or STAY ON the medications listed below when I get home from the hospital:    Blood pressure cuff  -- Monitor blood pressures twice daily, if systolic>140 or diastolic>90 to contact OB provider  -- Indication: For preeclampsia    ibuprofen 600 mg oral tablet  -- 1 tab(s) by mouth every 6 hours  -- Indication: For Pain    Tylenol 325 mg oral tablet  -- 3 tab(s) by mouth every 6 hours  -- Indication: For Pain    labetalol 400 mg oral tablet  -- 1 tab(s) by mouth 3 times a day  -- Indication: For HTN    Procardia XL 90 mg oral tablet, extended release  -- 1 tab(s) by mouth once a day  -- Indication: For HTN    Prenatal 1 Plus 1 oral tablet  -- 1 tab(s) by mouth once a day  -- Indication: For postpartum supplementation    I will START or STAY ON the medications listed below when I get home from the hospital:    Blood pressure cuff  -- Monitor blood pressures twice daily, if systolic>140 or diastolic>90 to contact OB provider  -- Indication: For preeclampsia    ibuprofen 600 mg oral tablet  -- 1 tab(s) by mouth every 6 hours  -- Indication: For Pain    Tylenol 325 mg oral tablet  -- 3 tab(s) by mouth every 6 hours  -- Indication: For Pain    labetalol 300 mg oral tablet  -- 2 tab(s) by mouth 3 times a day  -- Indication: For preeclapmpsia'     Procardia XL 90 mg oral tablet, extended release  -- 1 tab(s) by mouth once a day  -- Indication: For HTN    Prenatal 1 Plus 1 oral tablet  -- 1 tab(s) by mouth once a day  -- Indication: For postpartum supplementation    I will START or STAY ON the medications listed below when I get home from the hospital:    Blood pressure cuff  -- Record blood pressure (BP) twice daily. If your systolic BP (top number) is greater than 160, or you diastolic (bottom number) is greater than 110, please visit the ED for further workup.  -- Indication: For pec    ibuprofen 600 mg oral tablet  -- 1 tab(s) by mouth every 6 hours  -- Indication: For Pain    Tylenol 325 mg oral tablet  -- 3 tab(s) by mouth every 6 hours  -- Indication: For Pain    labetalol 300 mg oral tablet  -- 2 tab(s) by mouth 3 times a day  -- Indication: For pec    NIFEdipine 60 mg oral tablet, extended release  -- 2 tab(s) by mouth once a day  -- Indication: For Hypertension    Prenatal 1 Plus 1 oral tablet  -- 1 tab(s) by mouth once a day  -- Indication: For postpartum supplementation    I will START or STAY ON the medications listed below when I get home from the hospital:    Blood pressure cuff  -- Record blood pressure (BP) twice daily. If your systolic BP (top number) is greater than 160, or you diastolic (bottom number) is greater than 110, please visit the ED for further workup.  -- Indication: For pec    ibuprofen 600 mg oral tablet  -- 1 tab(s) by mouth every 6 hours  -- Indication: For Pain    Tylenol 325 mg oral tablet  -- 3 tab(s) by mouth every 6 hours  -- Indication: For Pain    labetalol 300 mg oral tablet  -- 2 tab(s) by mouth 2 times a day  -- Indication: For pec    Procardia XL 90 mg oral tablet, extended release  -- 1 tab(s) by mouth once a day  -- Indication: For pec    Prenatal 1 Plus 1 oral tablet  -- 1 tab(s) by mouth once a day  -- Indication: For postpartum supplementation

## 2025-04-24 NOTE — DISCHARGE NOTE OB - FINANCIAL ASSISTANCE
[facility] provides services at a reduced cost to those who are determined to be eligible through [facility]’s financial assistance program. Information regarding [facility]’s financial assistance program can be found by going to [email or web address] or by calling [phone].

## 2025-04-24 NOTE — PROGRESS NOTE ADULT - SUBJECTIVE AND OBJECTIVE BOX
ASHTYN HERNANDEZ is a 35y  now POD#1 s/p rCS +BTL @ 35w cHTNsiPECoM, Hydral 10/10 Lab 20/20/40/80, Hydral 5, procardia 10IR/20IR    S:    No acute events overnight.   The patient has no complaints.  Pain controlled with current treatment regimen.   She is ambulating without difficulty and tolerating PO.   - flatus/-BM  [place] in situ, for removal s/p Mg  She endorses appropriate lochia, which is decreasing.   She is attempting breast-pumping   She denies fevers, chills, nausea and vomiting.   She denies lightheadedness, dizziness, palpitations, chest pain and SOB.     O:    T(C): 36.4 (25 @ 06:00), Max: 36.8 (25 @ 17:27)  HR: 62 (25 @ 06:00) (56 - 83)  BP: 152/83 (25 @ 06:00) (112/72 - 268/138)  RR: 16 (25 @ 06:00) (11 - 21)  SpO2: 97% (25 @ 06:00) (95% - 100%)    Gen: NAD, AOx3  Pulm: Resting comfortably on room air  Abdomen:  Soft, non-tender, non-distended  Incision: Clean/dry/intact with steri-strips in place   Uterus:  Fundus firm below umbilicus  VE:  Expectant lochia  Ext:  Non-tender and non-edematous                          10.3   9.39  )-----------( 199      ( 2025 17:25 )             31.7         133[L]  |  102  |  18.9  ----------------------------<  78  4.5   |  16.0[L]  |  0.77    Ca    8.6      2025 17:10  Mg     5.8         TPro  5.8[L]  /  Alb  2.9[L]  /  TBili  <0.2[L]  /  DBili  x   /  AST  28  /  ALT  19  /  AlkPhos  190[H]

## 2025-04-24 NOTE — CHART NOTE - NSCHARTNOTEFT_GEN_A_CORE
Patient with severe range blood pressure at time that her dose of labetalol 300mg was due. Systolic 170s over 15m necessitating quick acting antihypertensive medication to be administered. Patient currently on pp unit. Procardia 10 IR administered. Received labetalol 300mg at 2250. Plan to repeat blood pressure 20m after the procardia 10 IR was administered and if below severe range will continue to monitor. If remains severe will bring down to L&D for closer monitoring.     Patient is a cHTN with superimposed preeclampsia. Received 24h of MgSO4 off since 1900. Currently on labetalol 300mg TID + Procardia 60XL. Patient to be uptitrated to labetalol 400mg TID. Plan for additional 100mg labetalol to be administered at this time with plan to resume with 400mg TID moving forward.     D/w Dr. Doyle

## 2025-04-24 NOTE — DISCHARGE NOTE OB - PATIENT PORTAL LINK FT
You can access the FollowMyHealth Patient Portal offered by [facility] by registering at the following website: [email or web address]. By joining [email or web address]’s FollowMyHealth portal, you will also be able to view your health information using other applications (apps) compatible with our system.

## 2025-04-24 NOTE — CHART NOTE - NSCHARTNOTEFT_GEN_A_CORE
INTERVAL HPI/OVERNIGHT EVENTS:  35y Female s/p c section under spinal anesthesia with duramorph for post op analgesia on 4/23/25    Vital Signs Last 24 Hrs  T(C): 36.4 (24 Apr 2025 10:00), Max: 36.8 (23 Apr 2025 17:27)  T(F): 97.5 (24 Apr 2025 10:00), Max: 98.2 (23 Apr 2025 17:27)  HR: 66 (24 Apr 2025 10:00) (56 - 83)  BP: 131/73 (24 Apr 2025 10:00) (108/68 - 268/138)  RR: 18 (24 Apr 2025 10:00) (11 - 21)  SpO2: 100% (24 Apr 2025 10:00) (95% - 100%)    Parameters below as of 24 Apr 2025 10:00  Patient On (Oxygen Delivery Method): room air            Patient's overall anesthesia satisfaction: Positive    Patients pain is well controlled    No respiratory events overnight    No pruritis at this time    Patient doing well     No headache      No residual numbness or weakness, sensory and motor function intact.    No anesthetic complications or complaints noted or reported          .

## 2025-04-25 RX ORDER — MEASLES,MUMPS,RUBELLA LIVE VAC 20000/0.5
0.5 VIAL (EA) SUBCUTANEOUS ONCE
Refills: 0 | Status: COMPLETED | OUTPATIENT
Start: 2025-04-25 | End: 2025-04-25

## 2025-04-25 RX ADMIN — Medication 600 MILLIGRAM(S): at 13:30

## 2025-04-25 RX ADMIN — LABETALOL HYDROCHLORIDE 400 MILLIGRAM(S): 200 TABLET, FILM COATED ORAL at 15:02

## 2025-04-25 RX ADMIN — Medication 600 MILLIGRAM(S): at 06:36

## 2025-04-25 RX ADMIN — Medication 975 MILLIGRAM(S): at 15:02

## 2025-04-25 RX ADMIN — Medication 0.5 MILLILITER(S): at 17:50

## 2025-04-25 RX ADMIN — ENOXAPARIN SODIUM 40 MILLIGRAM(S): 100 INJECTION SUBCUTANEOUS at 06:36

## 2025-04-25 RX ADMIN — LABETALOL HYDROCHLORIDE 400 MILLIGRAM(S): 200 TABLET, FILM COATED ORAL at 06:36

## 2025-04-25 RX ADMIN — Medication 600 MILLIGRAM(S): at 17:49

## 2025-04-25 RX ADMIN — Medication 600 MILLIGRAM(S): at 23:32

## 2025-04-25 RX ADMIN — Medication 975 MILLIGRAM(S): at 02:47

## 2025-04-25 RX ADMIN — LABETALOL HYDROCHLORIDE 400 MILLIGRAM(S): 200 TABLET, FILM COATED ORAL at 21:53

## 2025-04-25 RX ADMIN — Medication 975 MILLIGRAM(S): at 09:00

## 2025-04-25 RX ADMIN — Medication 975 MILLIGRAM(S): at 21:53

## 2025-04-25 RX ADMIN — Medication 60 MILLIGRAM(S): at 06:36

## 2025-04-25 RX ADMIN — Medication 975 MILLIGRAM(S): at 10:00

## 2025-04-25 NOTE — PROGRESS NOTE ADULT - SUBJECTIVE AND OBJECTIVE BOX
ASHTYN HERNANDEZ is a 35y  now POD#2 s/p rCS +BTL @ 35w cHTNsiPECoM, Hydral 10/10 Lab 20/20/40/80, Hydral 5, procardia 10IR/20IR/10IR    S:    No acute events overnight.   The patient has no complaints.  Pain controlled with current treatment regimen.   She is ambulating without difficulty and tolerating PO.   + flatus/-BM/+ voiding   She endorses appropriate lochia, which is decreasing.   She is  bottle feeding.   She denies fevers, chills, nausea and vomiting.   She denies lightheadedness, dizziness, palpitations, chest pain and SOB.       O:    T(C): 37 (25 @ 04:00), Max: 37 (25 @ 04:00)  HR: 69 (25 @ 04:00) (60 - 69)  BP: 127/73 (25 @ 04:00) (108/68 - 174/92)  RR: 18 (25 @ 04:00) (16 - 18)  SpO2: 96% (25 @ 04:00) (96% - 100%)    Gen: NAD, AOx3  Pulm: Resting comfortably on room air  Abdomen:  Soft, non-tender, non-distended  Incision: Clean/dry/intact   Uterus:  Fundus firm below umbilicus  VE:  Expectant lochia  Ext:  Non-tender and non-edematous                          9.2    15.37 )-----------( 186      ( 2025 05:36 )             29.2         130[L]  |  100  |  18.0  ----------------------------<  122[H]  4.7   |  15.0[L]  |  0.59    Ca    7.1[L]      2025 05:36  Mg     7.0         TPro  5.1[L]  /  Alb  2.5[L]  /  TBili  <0.2[L]  /  DBili  x   /  AST  27  /  ALT  16  /  AlkPhos  160[H]

## 2025-04-26 RX ORDER — LABETALOL HYDROCHLORIDE 200 MG/1
1 TABLET, FILM COATED ORAL
Refills: 0 | DISCHARGE

## 2025-04-26 RX ORDER — ACETAMINOPHEN 500 MG/5ML
3 LIQUID (ML) ORAL
Qty: 84 | Refills: 0
Start: 2025-04-26 | End: 2025-05-02

## 2025-04-26 RX ORDER — NIFEDIPINE 30 MG
90 TABLET, EXTENDED RELEASE 24 HR ORAL DAILY
Refills: 0 | Status: DISCONTINUED | OUTPATIENT
Start: 2025-04-28 | End: 2025-04-28

## 2025-04-26 RX ORDER — IBUPROFEN 200 MG
1 TABLET ORAL
Qty: 28 | Refills: 0
Start: 2025-04-26 | End: 2025-05-02

## 2025-04-26 RX ORDER — NIFEDIPINE 30 MG
1 TABLET, EXTENDED RELEASE 24 HR ORAL
Qty: 30 | Refills: 0
Start: 2025-04-26 | End: 2025-05-25

## 2025-04-26 RX ORDER — NIFEDIPINE 30 MG
60 TABLET, EXTENDED RELEASE 24 HR ORAL ONCE
Refills: 0 | Status: COMPLETED | OUTPATIENT
Start: 2025-04-27 | End: 2025-04-27

## 2025-04-26 RX ORDER — NIFEDIPINE 30 MG
1 TABLET, EXTENDED RELEASE 24 HR ORAL
Qty: 1 | Refills: 0
Start: 2025-04-26

## 2025-04-26 RX ORDER — NIFEDIPINE 30 MG
30 TABLET, EXTENDED RELEASE 24 HR ORAL ONCE
Refills: 0 | Status: COMPLETED | OUTPATIENT
Start: 2025-04-26 | End: 2025-04-26

## 2025-04-26 RX ADMIN — Medication 600 MILLIGRAM(S): at 06:16

## 2025-04-26 RX ADMIN — Medication 600 MILLIGRAM(S): at 18:00

## 2025-04-26 RX ADMIN — LABETALOL HYDROCHLORIDE 400 MILLIGRAM(S): 200 TABLET, FILM COATED ORAL at 06:16

## 2025-04-26 RX ADMIN — Medication 600 MILLIGRAM(S): at 13:12

## 2025-04-26 RX ADMIN — Medication 975 MILLIGRAM(S): at 21:38

## 2025-04-26 RX ADMIN — Medication 30 MILLIGRAM(S): at 20:25

## 2025-04-26 RX ADMIN — LABETALOL HYDROCHLORIDE 400 MILLIGRAM(S): 200 TABLET, FILM COATED ORAL at 13:15

## 2025-04-26 RX ADMIN — LABETALOL HYDROCHLORIDE 400 MILLIGRAM(S): 200 TABLET, FILM COATED ORAL at 20:27

## 2025-04-26 RX ADMIN — Medication 975 MILLIGRAM(S): at 08:30

## 2025-04-26 RX ADMIN — ENOXAPARIN SODIUM 40 MILLIGRAM(S): 100 INJECTION SUBCUTANEOUS at 06:17

## 2025-04-26 RX ADMIN — Medication 975 MILLIGRAM(S): at 03:35

## 2025-04-26 RX ADMIN — Medication 60 MILLIGRAM(S): at 06:16

## 2025-04-26 RX ADMIN — Medication 600 MILLIGRAM(S): at 23:53

## 2025-04-26 NOTE — PROGRESS NOTE ADULT - SUBJECTIVE AND OBJECTIVE BOX
ASHTYN HERNANDEZ is a 35y  now POD#3 s/p rCS +BTL @ 35w cHTNsiPECoM, Hydral 10/10 Lab 20/20/40/80, Hydral 5, procardia 10IR/20IR/10IR    S:    No acute events overnight.   The patient has no complaints.  Pain controlled with current treatment regimen.   She is ambulating without difficulty and tolerating PO.   + flatus/-BM/+ voiding   She endorses appropriate lochia, which is decreasing.   She is bottle feeding without difficulty.   She denies fevers, chills, nausea and vomiting.   She denies lightheadedness, dizziness, palpitations, chest pain and SOB.     O:    T(C): 37.1 (25 @ 02:00), Max: 37.1 (25 @ 02:00)  HR: 66 (25 @ 02:00) (64 - 76)  BP: 134/79 (25 @ 02:00) (119/72 - 152/72)  RR: 18 (25 @ 02:00) (18 - 18)  SpO2: 99% (25 @ 02:00) (97% - 99%)    Gen: NAD, AOx3  Pulm: Resting comfortably on room air  Abdomen:  Soft, non-tender, non-distended  Incision: Clean/dry/intact   Uterus:  Fundus firm below umbilicus  VE:  Expectant lochia  Ext:  Non-tender and non-edematous        Mg     7.0

## 2025-04-27 RX ORDER — LABETALOL HYDROCHLORIDE 200 MG/1
2 TABLET, FILM COATED ORAL
Qty: 180 | Refills: 1
Start: 2025-04-27 | End: 2025-06-25

## 2025-04-27 RX ORDER — LABETALOL HYDROCHLORIDE 200 MG/1
600 TABLET, FILM COATED ORAL EVERY 8 HOURS
Refills: 0 | Status: DISCONTINUED | OUTPATIENT
Start: 2025-04-27 | End: 2025-04-29

## 2025-04-27 RX ORDER — NIFEDIPINE 30 MG
1 TABLET, EXTENDED RELEASE 24 HR ORAL
Qty: 30 | Refills: 1
Start: 2025-04-27 | End: 2025-06-25

## 2025-04-27 RX ORDER — NIFEDIPINE 30 MG
30 TABLET, EXTENDED RELEASE 24 HR ORAL ONCE
Refills: 0 | Status: COMPLETED | OUTPATIENT
Start: 2025-04-27 | End: 2025-04-27

## 2025-04-27 RX ADMIN — Medication 600 MILLIGRAM(S): at 17:42

## 2025-04-27 RX ADMIN — Medication 600 MILLIGRAM(S): at 11:11

## 2025-04-27 RX ADMIN — Medication 975 MILLIGRAM(S): at 20:44

## 2025-04-27 RX ADMIN — Medication 600 MILLIGRAM(S): at 06:08

## 2025-04-27 RX ADMIN — LABETALOL HYDROCHLORIDE 400 MILLIGRAM(S): 200 TABLET, FILM COATED ORAL at 14:04

## 2025-04-27 RX ADMIN — Medication 30 MILLIGRAM(S): at 09:14

## 2025-04-27 RX ADMIN — Medication 975 MILLIGRAM(S): at 15:53

## 2025-04-27 RX ADMIN — Medication 600 MILLIGRAM(S): at 23:41

## 2025-04-27 RX ADMIN — Medication 975 MILLIGRAM(S): at 03:36

## 2025-04-27 RX ADMIN — ENOXAPARIN SODIUM 40 MILLIGRAM(S): 100 INJECTION SUBCUTANEOUS at 06:08

## 2025-04-27 RX ADMIN — LABETALOL HYDROCHLORIDE 600 MILLIGRAM(S): 200 TABLET, FILM COATED ORAL at 21:06

## 2025-04-27 RX ADMIN — Medication 60 MILLIGRAM(S): at 06:08

## 2025-04-27 RX ADMIN — LABETALOL HYDROCHLORIDE 400 MILLIGRAM(S): 200 TABLET, FILM COATED ORAL at 06:09

## 2025-04-27 NOTE — PROGRESS NOTE ADULT - SUBJECTIVE AND OBJECTIVE BOX
ASHTYN HERNANDEZ is a 35y  now POD#4 s/p repeat  section at 33 EGA anf bilateral salpingectomy, complicated by siPECwSF s/p Mg.    Subjective:   No acute events overnight.  Patient has no complaints.  Pain is well controlled.  +flatus, +BM, + voiding.  Ambulating and tolerating PO.  Appropriate lochia.  Denies fever, chills, nausea, and vomiting.  She denies lightheadedness, dizziness, HA, blurry vision, palpitations, chest pain and SOB.     Objective:    T(C): 36.7 (25 @ 03:49), Max: 37.2 (25 @ 15:50)  HR: 65 (25 @ 03:49) (60 - 84)  BP: 146/89 (25 @ 03:49) (133/86 - 159/96)  RR: 18 (25 @ 03:49) (16 - 18)  SpO2: 98% (25 @ 03:49) (98% - 99%)    Physical exam:  General: AOx3, NAD.  Abdomen: Soft, appropriately tender to palpitation, fundus firm.  Incision: Clean, dry and intact incision, steri strips in place  : Voiding  Vaginal: expectant lochia  Ext: No DVT signs, warm extremities.

## 2025-04-28 RX ORDER — NIFEDIPINE 30 MG
30 TABLET, EXTENDED RELEASE 24 HR ORAL ONCE
Refills: 0 | Status: DISCONTINUED | OUTPATIENT
Start: 2025-04-28 | End: 2025-04-29

## 2025-04-28 RX ORDER — NIFEDIPINE 30 MG
120 TABLET, EXTENDED RELEASE 24 HR ORAL DAILY
Refills: 0 | Status: DISCONTINUED | OUTPATIENT
Start: 2025-04-29 | End: 2025-04-29

## 2025-04-28 RX ORDER — NIFEDIPINE 30 MG
2 TABLET, EXTENDED RELEASE 24 HR ORAL
Qty: 60 | Refills: 1
Start: 2025-04-28 | End: 2025-06-26

## 2025-04-28 RX ADMIN — Medication 600 MILLIGRAM(S): at 12:11

## 2025-04-28 RX ADMIN — LABETALOL HYDROCHLORIDE 600 MILLIGRAM(S): 200 TABLET, FILM COATED ORAL at 05:48

## 2025-04-28 RX ADMIN — Medication 975 MILLIGRAM(S): at 09:22

## 2025-04-28 RX ADMIN — Medication 600 MILLIGRAM(S): at 05:47

## 2025-04-28 RX ADMIN — Medication 975 MILLIGRAM(S): at 08:29

## 2025-04-28 RX ADMIN — Medication 975 MILLIGRAM(S): at 03:03

## 2025-04-28 RX ADMIN — LABETALOL HYDROCHLORIDE 600 MILLIGRAM(S): 200 TABLET, FILM COATED ORAL at 21:31

## 2025-04-28 RX ADMIN — LABETALOL HYDROCHLORIDE 600 MILLIGRAM(S): 200 TABLET, FILM COATED ORAL at 15:45

## 2025-04-28 RX ADMIN — Medication 90 MILLIGRAM(S): at 05:47

## 2025-04-28 NOTE — PROGRESS NOTE ADULT - SUBJECTIVE AND OBJECTIVE BOX
ASHTYN HERNANDEZ is a 35y  now POD#5 s/p repeat  section at 33 EGA anf bilateral salpingectomy, complicated by siPECwSF s/p Mg.    Subjective:   Lab 400 TID increased to 600 TID given continued elevated pressures  Patient reports some breast pain, does not desire to pump or breast feed as she wants to exclusive formula feed.   Pain is well controlled.  +flatus, +BM, + voiding.  Ambulating and tolerating PO.  Appropriate lochia.  Denies fever, chills, nausea, and vomiting.  She denies lightheadedness, dizziness, HA, blurry vision, palpitations, chest pain and SOB.     Objective:    Vital Signs Last 24 Hrs  T(C): 36.4 (2025 00:00), Max: 37 (2025 12:09)  T(F): 97.5 (2025 00:00), Max: 98.6 (2025 12:09)  HR: 69 (2025 00:00) (69 - 84)  BP: 128/82 (2025 00:00) (128/82 - 156/92)  RR: 18 (2025 00:00) (17 - 18)  SpO2: 98% (2025 00:00) (98% - 99%)    Parameters below as of 2025 00:00  Patient On (Oxygen Delivery Method): room air        Physical exam:  General: AOx3, NAD.  Breast: b/l engorgement, no erythema, fluctuance or warmth  Abdomen: Soft, appropriately tender to palpitation, fundus firm.  Incision: Clean, dry and intact incision, steri strips in place  : Voiding  Vaginal: expectant lochia  Ext: No DVT signs, warm extremities.                 ASHTYN HERNANDEZ is a 35y  now POD#5 s/p repeat  section at 33 EGA and bilateral salpingectomy, complicated by siPECwSF s/p Mg.    Subjective:   Lab 400 TID increased to 600 TID given continued elevated pressures  Patient reports some breast pain, does not desire to pump or breast feed as she wants to exclusive formula feed.   Pain is well controlled.  +flatus, +BM, + voiding.  Ambulating and tolerating PO.  Appropriate lochia.  Denies fever, chills, nausea, and vomiting.  She denies lightheadedness, dizziness, HA, blurry vision, palpitations, chest pain and SOB.     Objective:    Vital Signs Last 24 Hrs  T(C): 36.4 (2025 00:00), Max: 37 (2025 12:09)  T(F): 97.5 (2025 00:00), Max: 98.6 (2025 12:09)  HR: 69 (2025 00:00) (69 - 84)  BP: 128/82 (2025 00:00) (128/82 - 156/92)  RR: 18 (2025 00:00) (17 - 18)  SpO2: 98% (2025 00:00) (98% - 99%)    Parameters below as of 2025 00:00  Patient On (Oxygen Delivery Method): room air        Physical exam:  General: AOx3, NAD.  Breast: b/l engorgement, no erythema, fluctuance or warmth  Abdomen: Soft, appropriately tender to palpitation, fundus firm.  Incision: Clean, dry and intact incision, steri strips in place  : Voiding  Vaginal: expectant lochia  Ext: No DVT signs, warm extremities.

## 2025-04-29 VITALS
OXYGEN SATURATION: 99 % | RESPIRATION RATE: 18 BRPM | SYSTOLIC BLOOD PRESSURE: 126 MMHG | DIASTOLIC BLOOD PRESSURE: 81 MMHG | TEMPERATURE: 98 F | HEART RATE: 70 BPM

## 2025-04-29 DIAGNOSIS — I10 ESSENTIAL (PRIMARY) HYPERTENSION: ICD-10-CM

## 2025-04-29 PROCEDURE — 85610 PROTHROMBIN TIME: CPT

## 2025-04-29 PROCEDURE — 86901 BLOOD TYPING SEROLOGIC RH(D): CPT

## 2025-04-29 PROCEDURE — 93010 ELECTROCARDIOGRAM REPORT: CPT

## 2025-04-29 PROCEDURE — T1013: CPT

## 2025-04-29 PROCEDURE — 82570 ASSAY OF URINE CREATININE: CPT

## 2025-04-29 PROCEDURE — 86703 HIV-1/HIV-2 1 RESULT ANTBDY: CPT

## 2025-04-29 PROCEDURE — 83615 LACTATE (LD) (LDH) ENZYME: CPT

## 2025-04-29 PROCEDURE — 85025 COMPLETE CBC W/AUTO DIFF WBC: CPT

## 2025-04-29 PROCEDURE — 83735 ASSAY OF MAGNESIUM: CPT

## 2025-04-29 PROCEDURE — 87389 HIV-1 AG W/HIV-1&-2 AB AG IA: CPT

## 2025-04-29 PROCEDURE — 86765 RUBEOLA ANTIBODY: CPT

## 2025-04-29 PROCEDURE — 85384 FIBRINOGEN ACTIVITY: CPT

## 2025-04-29 PROCEDURE — 36415 COLL VENOUS BLD VENIPUNCTURE: CPT

## 2025-04-29 PROCEDURE — 93005 ELECTROCARDIOGRAM TRACING: CPT

## 2025-04-29 PROCEDURE — 85730 THROMBOPLASTIN TIME PARTIAL: CPT

## 2025-04-29 PROCEDURE — 88302 TISSUE EXAM BY PATHOLOGIST: CPT

## 2025-04-29 PROCEDURE — 80053 COMPREHEN METABOLIC PANEL: CPT

## 2025-04-29 PROCEDURE — 86780 TREPONEMA PALLIDUM: CPT

## 2025-04-29 PROCEDURE — 90707 MMR VACCINE SC: CPT

## 2025-04-29 PROCEDURE — 59050 FETAL MONITOR W/REPORT: CPT

## 2025-04-29 PROCEDURE — 86850 RBC ANTIBODY SCREEN: CPT

## 2025-04-29 PROCEDURE — 81001 URINALYSIS AUTO W/SCOPE: CPT

## 2025-04-29 PROCEDURE — 84156 ASSAY OF PROTEIN URINE: CPT

## 2025-04-29 PROCEDURE — 84550 ASSAY OF BLOOD/URIC ACID: CPT

## 2025-04-29 PROCEDURE — 99233 SBSQ HOSP IP/OBS HIGH 50: CPT

## 2025-04-29 PROCEDURE — 86900 BLOOD TYPING SEROLOGIC ABO: CPT

## 2025-04-29 RX ORDER — NIFEDIPINE 30 MG
2 TABLET, EXTENDED RELEASE 24 HR ORAL
Qty: 60 | Refills: 0
Start: 2025-04-29 | End: 2025-05-28

## 2025-04-29 RX ORDER — LABETALOL HYDROCHLORIDE 200 MG/1
2 TABLET, FILM COATED ORAL
Qty: 120 | Refills: 0
Start: 2025-04-29 | End: 2025-05-28

## 2025-04-29 RX ORDER — LABETALOL HYDROCHLORIDE 200 MG/1
2 TABLET, FILM COATED ORAL
Qty: 180 | Refills: 0
Start: 2025-04-29 | End: 2025-05-28

## 2025-04-29 RX ORDER — NIFEDIPINE 30 MG
90 TABLET, EXTENDED RELEASE 24 HR ORAL ONCE
Refills: 0 | Status: COMPLETED | OUTPATIENT
Start: 2025-04-29 | End: 2025-04-29

## 2025-04-29 RX ORDER — NIFEDIPINE 30 MG
1 TABLET, EXTENDED RELEASE 24 HR ORAL
Qty: 30 | Refills: 0
Start: 2025-04-29 | End: 2025-05-28

## 2025-04-29 RX ADMIN — Medication 600 MILLIGRAM(S): at 12:27

## 2025-04-29 RX ADMIN — Medication 90 MILLIGRAM(S): at 09:20

## 2025-04-29 RX ADMIN — LABETALOL HYDROCHLORIDE 600 MILLIGRAM(S): 200 TABLET, FILM COATED ORAL at 15:43

## 2025-04-29 RX ADMIN — LABETALOL HYDROCHLORIDE 600 MILLIGRAM(S): 200 TABLET, FILM COATED ORAL at 05:13

## 2025-04-29 NOTE — CONSULT NOTE ADULT - SUBJECTIVE AND OBJECTIVE BOX
[facility]                                              CARDIOLOGY AT [place] [address]                                             Telephone: [phone]. Fax:[phone]                                                       CARDIOLOGY CONSULTATION NOTE                                                                                             History obtained by: Patient and medical record  Community Cardiologist:   Unsure - seen during last pregnancy and cant remember name   obtained: Yes [ x ] No [  ] [id number]  Reason for Consultation:  HTN  Available out pt records reviewed: Yes [  ] No [  ]    Chief complaint:    Patient is a 35y old  Female who presents with a chief complaint of delivery (2025 04:47)      HPI:  35y  at 35w GA presents to L&D for elevated BPs in the office. now POD#6 s/p repeat  section at 35 EGA and bilateral salpingectomy, complicated by siPECwSF s/p Mg.       A cardia consult was placed due to continue elevated bp.   Patient states she had elevate bp as well, and stayed on bp meds between pregnancies.  Her home medications included Labetalol 100mg po bid and procardia 60.     Denies headache dizziness, chest pain, sob, pnd, orthopnea hemoptysis, n/v/d/c/abd pain, fever, chills, syncope, near syncope,  urinary discomfort cramps or any other pain.      POB:   G1: CS due to twin gestation  G2: CS at 30w due to PEC  PGYN: -fibroids, -ovarian cysts, denies STD hx, denies abnormal PAPs   PMH: Denies  PSH: CSx2, LEEP  SH: Denies EtOH, tobacco and illicit drug use during this pregnancy; feels safe at home   Meds: PNVs, Labetaolol 100mg BID  Allergies: NKDA    CARDIAC TESTING   ECHO:    STRESS:    CATH:     ELECTROPHYSIOLOGY:     PAST MEDICAL HISTORY  Hypertension      PAST SURGICAL HISTORY  S/P         SOCIAL HISTORY:  Denies smoking/alcohol/drugs  CIGARETTES:     ALCOHOL:  DRUGS:    FAMILY HISTORY:  Family history of diabetes mellitus (Father)  Family history of renal failure (Father)  Family History of Cardiovascular Disease:  Yes [  ] No [ x ]  Coronary Artery Disease in first degree relative: Yes [  ] No [ x ]  Sudden Cardiac Death in First degree relative: Yes [  ] No [x  ]    HOME MEDICATIONS:  Prenatal 1 Plus 1 oral tablet: 1 tab(s) orally once a day (2025 17:48)    CURRENT CARDIAC MEDICATIONS:  labetalol 600 milliGRAM(s) Oral every 8 hours    CURRENT OTHER MEDICATIONS:  acetaminophen     Tablet .. 975 milliGRAM(s) Oral <User Schedule>  diphenhydrAMINE 25 milliGRAM(s) Oral every 6 hours PRN Pruritus  ibuprofen  Tablet. 600 milliGRAM(s) Oral every 6 hours  magnesium sulfate Infusion 2 Gm/Hr (50 mL/Hr) IV Continuous <Continuous>, Stop order after: 24 Hours  magnesium sulfate Infusion 2 Gm/Hr (50 mL/Hr) IV Continuous <Continuous>, Stop order after: 24 Hours  oxyCODONE    IR 5 milliGRAM(s) Oral every 3 hours PRN Moderate to Severe Pain (4-10)  oxyCODONE    IR 5 milliGRAM(s) Oral once, Stop order after: 1 Doses PRN Moderate to Severe Pain (4-10)  magnesium hydroxide Suspension 30 milliLiter(s) Oral two times a day PRN Constipation  simethicone 80 milliGRAM(s) Chew every 4 hours PRN Gas  diphtheria/tetanus/pertussis (acellular) Vaccine (Adacel) 0.5 milliLiter(s) IntraMuscular once, Stop order after: 1 Doses  enoxaparin Injectable 40 milliGRAM(s) SubCutaneous every 24 hours  lactated ringers. 1000 milliLiter(s) (125 mL/Hr) IV Continuous <Continuous>  lanolin Ointment 1 Application(s) Topical every 6 hours PRN Sore Nipples  oxytocin Infusion 42 milliUNIT(s)/Min (42 mL/Hr) IV Continuous <Continuous>, Stop order after: 6 Hours    ALLERGIES:   No Known Allergies    REVIEW OF SYMPTOMS:   CONSTITUTIONAL: No fever, no chills, no weight loss, no weight gain, no fatigue   ENMT:  No vertigo; No sinus or throat pain  NECK: No pain or stiffness  CARDIOVASCULAR: No chest pain, no dyspnea, no syncope/presyncope, no palpitations, no dizziness, no Orthopnea, no Paroxsymal nocturnal dyspnea  RESPIRATORY: no Shortness of breath, no cough, no wheezing  : No dysuria, no hematuria   GI: No dark color stool, no nausea, no diarrhea, no constipation, no abdominal pain   NEURO: No headache, no slurred speech   MUSCULOSKELETAL: No joint pain or swelling; No muscle, back, or extremity pain  PSYCH: No agitation, no anxiety.    ALL OTHER REVIEW OF SYSTEMS ARE NEGATIVE.    VITAL SIGNS:  T(C): 36.9 (25 @ 07:57), Max: 36.9 (25 @ 19:47)  T(F): 98.5 (25 @ 07:57), Max: 98.5 (25 @ 07:57)  HR: 71 (25 @ 07:57) (71 - 77)  BP: 128/78 (25 @ 07:57) (122/78 - 143/87)  RR: 18 (25 @ 07:57) (17 - 18)  SpO2: 99% (25 @ 07:57) (98% - 99%)    INTAKE AND OUTPUT:     PHYSICAL EXAM:  Constitutional: Comfortable . No acute distress.   HEENT: Atraumatic and normocephalic , neck is supple . no JVD. No carotid bruit.  CNS: A&Ox3. No focal deficits.   Respiratory: CTAB, unlabored   Cardiovascular: RRR normal s1 s2. No murmur. No rubs or gallop.  Gastrointestinal: Soft, non-tender. +Bowel sounds.   Extremities: 2+ Peripheral Pulses, No clubbing, cyanosis, or edema  Psychiatric: Calm . no agitation.   Skin: Warm and dry, no ulcers on extremities     LABS:    INTERPRETATION OF TELEMETRY:   None    ECG:   Pending  Prior ECG: Yes [ x ] No [  ]  Ventricular Rate 65 BPM  Atrial Rate 65 BPM  P-R Interval 158 ms  QRS Duration 78 ms  Q-T Interval 456 ms  QTC Calculation(Bazett) 474 ms  P Axis 57 degrees  R Axis 15 degrees  T Axis 42 degrees  Diagnosis Line Normal sinus rhythm with sinus arrhythmia  Possible Left atrial enlargement  Borderline ECG                                                [facility]                                              CARDIOLOGY AT [place] [address]                                             Telephone: [phone]. Fax:[phone]                                                       CARDIOLOGY CONSULTATION NOTE                                                                                             History obtained by: Patient and medical record  Community Cardiologist:   Unsure - seen during last pregnancy and cant remember name   obtained: Yes [ x ] No [  ] [id number]  Reason for Consultation:  HTN  Available out pt records reviewed: Yes [  ] No [  ]    Chief complaint:    Patient is a 35y old  Female who presents with a chief complaint of delivery        HPI:  35y  at 35w GA presents to L&D for elevated BPs in the office. now POD#6 s/p repeat  section at 35 EGA and bilateral salpingectomy, complicated by siPECwSF s/p Mg.       A cardia consult was placed due to continue elevated bp.   Patient states she had elevate bp as well, and stayed on bp meds between pregnancies.  Her home medications included Labetalol 100mg po bid and procardia 60.     Denies headache dizziness, chest pain, sob, pnd, orthopnea hemoptysis, n/v/d/c/abd pain, fever, chills, syncope, near syncope,  urinary discomfort cramps or any other pain.      POB:   G1: CS due to twin gestation  G2: CS at 30w due to PEC  PGYN: -fibroids, -ovarian cysts, denies STD hx, denies abnormal PAPs   PMH: Denies  PSH: CSx2, LEEP  SH: Denies EtOH, tobacco and illicit drug use during this pregnancy; feels safe at home   Meds: PNVs, Labetaolol 100mg BID  Allergies: NKDA    CARDIAC TESTING   ECHO:    STRESS:    CATH:     ELECTROPHYSIOLOGY:     PAST MEDICAL HISTORY  Hypertension      PAST SURGICAL HISTORY  S/P         SOCIAL HISTORY:  Denies smoking/alcohol/drugs  CIGARETTES:     ALCOHOL:  DRUGS:    FAMILY HISTORY:  Family history of diabetes mellitus (Father)  Family history of renal failure (Father)  Family History of Cardiovascular Disease:  Yes [  ] No [ x ]  Coronary Artery Disease in first degree relative: Yes [  ] No [ x ]  Sudden Cardiac Death in First degree relative: Yes [  ] No [x  ]    HOME MEDICATIONS:  Prenatal 1 Plus 1 oral tablet: 1 tab(s) orally once a day (2025 17:48)    CURRENT CARDIAC MEDICATIONS:  labetalol 600 milliGRAM(s) Oral every 8 hours    CURRENT OTHER MEDICATIONS:  acetaminophen     Tablet .. 975 milliGRAM(s) Oral <User Schedule>  diphenhydrAMINE 25 milliGRAM(s) Oral every 6 hours PRN Pruritus  ibuprofen  Tablet. 600 milliGRAM(s) Oral every 6 hours  magnesium sulfate Infusion 2 Gm/Hr (50 mL/Hr) IV Continuous <Continuous>, Stop order after: 24 Hours  magnesium sulfate Infusion 2 Gm/Hr (50 mL/Hr) IV Continuous <Continuous>, Stop order after: 24 Hours  oxyCODONE    IR 5 milliGRAM(s) Oral every 3 hours PRN Moderate to Severe Pain (4-10)  oxyCODONE    IR 5 milliGRAM(s) Oral once, Stop order after: 1 Doses PRN Moderate to Severe Pain (4-10)  magnesium hydroxide Suspension 30 milliLiter(s) Oral two times a day PRN Constipation  simethicone 80 milliGRAM(s) Chew every 4 hours PRN Gas  diphtheria/tetanus/pertussis (acellular) Vaccine (Adacel) 0.5 milliLiter(s) IntraMuscular once, Stop order after: 1 Doses  enoxaparin Injectable 40 milliGRAM(s) SubCutaneous every 24 hours  lactated ringers. 1000 milliLiter(s) (125 mL/Hr) IV Continuous <Continuous>  lanolin Ointment 1 Application(s) Topical every 6 hours PRN Sore Nipples  oxytocin Infusion 42 milliUNIT(s)/Min (42 mL/Hr) IV Continuous <Continuous>, Stop order after: 6 Hours    ALLERGIES:   No Known Allergies    REVIEW OF SYMPTOMS:   CONSTITUTIONAL: No fever, no chills, no weight loss, no weight gain, no fatigue   ENMT:  No vertigo; No sinus or throat pain  NECK: No pain or stiffness  CARDIOVASCULAR: No chest pain, no dyspnea, no syncope/presyncope, no palpitations, no dizziness, no Orthopnea, no Paroxsymal nocturnal dyspnea  RESPIRATORY: no Shortness of breath, no cough, no wheezing  : No dysuria, no hematuria   GI: No dark color stool, no nausea, no diarrhea, no constipation, no abdominal pain   NEURO: No headache, no slurred speech   MUSCULOSKELETAL: No joint pain or swelling; No muscle, back, or extremity pain  PSYCH: No agitation, no anxiety.    ALL OTHER REVIEW OF SYSTEMS ARE NEGATIVE.    VITAL SIGNS:  T(C): 36.9 (25 @ 07:57), Max: 36.9 (25 @ 19:47)  T(F): 98.5 (25 @ 07:57), Max: 98.5 (25 @ 07:57)  HR: 71 (25 @ 07:57) (71 - 77)  BP: 128/78 (25 @ 07:57) (122/78 - 143/87)  RR: 18 (25 @ 07:57) (17 - 18)  SpO2: 99% (25 @ 07:57) (98% - 99%)    INTAKE AND OUTPUT:     PHYSICAL EXAM:  Constitutional: Comfortable . No acute distress.   HEENT: Atraumatic and normocephalic , neck is supple . no JVD. No carotid bruit.  CNS: A&Ox3. No focal deficits.   Respiratory: CTAB, unlabored   Cardiovascular: RRR normal s1 s2. No murmur. No rubs or gallop.  Gastrointestinal: Soft, non-tender. +Bowel sounds.   Extremities: 2+ Peripheral Pulses, No clubbing, cyanosis, or edema  Psychiatric: Calm . no agitation.   Skin: Warm and dry, no ulcers on extremities     LABS:    INTERPRETATION OF TELEMETRY:   None    ECG:   Pending  Prior ECG: Yes [ x ] No [  ]  Ventricular Rate 65 BPM  Atrial Rate 65 BPM  P-R Interval 158 ms  QRS Duration 78 ms  Q-T Interval 456 ms  QTC Calculation(Bazett) 474 ms  P Axis 57 degrees  R Axis 15 degrees  T Axis 42 degrees  Diagnosis Line Normal sinus rhythm with sinus arrhythmia  Possible Left atrial enlargement  Borderline ECG

## 2025-04-29 NOTE — CONSULT NOTE ADULT - ASSESSMENT
35y  at 35w GA presents to L&D for elevated BPs in the office. now POD#6 s/p repeat  section at 35 EGA and bilateral salpingectomy, complicated by siPECwSF s/p Mg.       A cardia consult was placed due to continue elevated bp.   Patient states she had elevate bp as well, and stayed on bp meds between pregnancies.  Her home medications included Labetalol 100mg po bid and procardia 60 daily.     Denies headache dizziness, chest pain, sob, pnd, orthopnea hemoptysis, n/v/d/c/abd pain, fever, chills, syncope, near syncope,  urinary discomfort cramps or any other pain.        PECwSF  -PO medications increased to Lab 600 TID and procardia 120 QD  -BPs on new regiment improved [id number]  - Denies S&S of PEC  - PIH labs stable   - s/p Mg infusion pp  - Discharge with cardio OB referral     35y  at 35w GA presents to L&D for elevated BPs in the office. now POD#6 s/p repeat  section at 35 EGA and bilateral salpingectomy, complicated by siPECwSF s/p Mg.       A cardia consult was placed due to continue elevated bp.   Patient states she had elevate bp as well, and stayed on bp meds between pregnancies.  Her home medications included Labetalol 100mg po bid and procardia 60 daily.     Denies headache dizziness, chest pain, sob, pnd, orthopnea hemoptysis, n/v/d/c/abd pain, fever, chills, syncope, near syncope,  urinary discomfort cramps or any other pain.

## 2025-04-29 NOTE — PROGRESS NOTE ADULT - ASSESSMENT
A/P: ASHTYN HERNANDEZ is a 35y  now POD#2 s/p rCS +BTL @ 35w cHTNsiPECoM, Hydral 10/10 Lab 20/20/40/80, Hydral 5, procardia 10IR/20IR/10IR     #Postpartum care  -Vital signs stable  -Hgb: 10.3 -> 9.2  -Tolerating PO, passing flatus, voiding spontaneously   -Advance care as tolerated   -Continue routine postpartum and postoperative care and education  -Male infant in NICU    #cHTNsiPECoM  -Patient denies any PEC-related symptoms  -Labile BPs; NR, MR and SR BPs present overnight  -On Labetalol 300mg TID and Procardia XL 30mg. She was given an extra 30mg dose of Procardia XL yesterday evening  and will be started on Procardia XL 60mg dly today.  -Cont q4 vitals    Dispo: Cont inpatient care
Assessment & Plan: ASHTYN HERNANDEZ is a 35y  now POD#5 s/p repeat  section at 35 EGA anf bilateral salpingectomy, complicated by siPECwSF s/p Mg.    #Routine postpartum care  - Stable, doing well postpartum  - Hgb 10.3 > 9.2  - Pain: well controlled, c/w current regimen  - GI: c/w regular diet, normal bowel function  - : voiding, lochia decreasing  - DVT ppx: ambulation encouraged, lovenox q 24hr  - Healthy baby male, declines circumcision  -Encouraged cold packs, chest compression and continued use of NSAIDs for management of breast engorgement    PECwSF  -PO medications increased to Lab 600 TID and procardia 90 QD  -BPs on new regiment 120/80s  - Denies S&S of PEC  -PIH labs stable   - s/p Mg infusion pp  - Discharge with cardio OB referral    Dispo: Patient meeting all postpartum and postoperative milestones and pending possible late discharge vs tomorrow pending BPs and attending approval.  
  A/P: ASHTYN HERNANDEZ is a 35y  now POD#1 s/p rCS +BTL @ 35w cHTNsiPECoM, Hydral 10/10 Lab 20/20/40/80, Hydral 5, procardia 10IR/20IR     #Postpartum care  -Vital signs stable  -Hgb: 10.3 -> AM labs pending   -Tolerating PO, not passing flatus yet  -To f/u TOV s/p raygoza removal after MgS04 therapy   -Advance care as tolerated   -Continue routine postpartum and postoperative care and education  -Male infant in NICU    #cHTNsiPECoM  -Patient denies any PEC-related symptoms  -Labile BPs, NR and MR BPs overnight   -On Labetalol 300mg TID and Procardia XL 30mg   -Cont q4 vitals    -Dispo: Continue inpatient care
A/P:  ASHTYN HERNANDEZ is a 35y  now POD#3 s/p rCS +BTL @ 35w cHTNsiPECoM, Hydral 10/10 Lab 20/20/40/80, Hydral 5, procardia 10IR/20IR/10IR    -#Postpartum care  -Vital signs stable  -Hgb: 10.3 -> 9.2  -Tolerating PO, passing flatus, voiding spontaneously   -Advance care as tolerated   -Continue routine postpartum and postoperative care and education  -Male infant in NICU    #cHTNsiPECoM  -Patient denies any PEC-related symptoms  -MR BPs overnight  -Currently on Procardia XL 60mg. To consider going up to 90mg.   -Cont q4 vitals    Dispo: Cont inpatient care  
Assessment & Plan: ASHTYN HERNANDEZ is a 35y  now POD#4 s/p repeat  section at 35 EGA anf bilateral salpingectomy, complicated by siPECwSF s/p Mg.    #Routine postpartum care  - Stable, doing well postpartum  - Hgb 10.3 > 9.2  - Pain: well controlled, c/w current regimen  - GI: c/w regular diet, normal bowel function  - : voiding, lochia decreasing  - DVT ppx: ambulation encouraged, lovenox q 24hr  - Healthy baby male, declines circumcision    PECwSF  - Moderately elevated BPs overnight  - Labetalol 400 TID, procardia 30mg added last night, will give procardia 60mg this AM and uptitrate morning dose to procardia 90mg tomorrow AM  - Denies S&S of PEC  - s/p Mg infusion pp  - Discharge with cardio OB referral    Dispo: Patient to be discharged when meeting all postpartum and postoperative milestones and pending attending approval.  
Assessment & Plan: ASHTYN HERNANDEZ is a 35y  now POD#6 s/p repeat  section at 35 EGA and bilateral salpingectomy, complicated by siPECwSF s/p Mg.    #Routine postpartum care  - Stable, doing well postpartum  - Hgb 10.3 > 9.2  - Pain: well controlled, c/w current regimen  - GI: c/w regular diet, normal bowel function  - : voiding, lochia decreasing  - DVT ppx: ambulation encouraged, lovenox q 24hr  - Healthy baby male, declines circumcision  -Encouraged cold packs, chest compression and continued use of NSAIDs for management of breast engorgement    PECwSF  -PO medications increased to Lab 600 TID and procardia 120 QD  -BPs on new regiment improved [id number]  - Denies S&S of PEC  - PIH labs stable   - s/p Mg infusion pp  - Discharge with cardio OB referral    Dispo: Patient meeting all postpartum and postoperative milestones and pending possible discharge pending BPs and attending approval.

## 2025-04-29 NOTE — CONSULT NOTE ADULT - PROBLEM SELECTOR RECOMMENDATION 9
35y  at 35w GA presents to L&D for elevated BPs.  On BP medications prior to pregnancy.  Currently bp stable 122/80 on Labetalol 600mg po TID  Patient requesting to go home today as well.  As discussed with Dr. Newton:  Restart procardia 60mg po daily   labatolol to 400mg po tid  Can D.C home with close follow up with cardiology - should be 1 week

## 2025-04-29 NOTE — PROGRESS NOTE ADULT - ATTENDING COMMENTS
Pt overall feels well on POD 4.  She denies any headaches, visual changes or epigastric discomfort.  Procardia was uptitrated last night to 90mg daily as most bps were in mild range (148-157/88-94).  Will continue antihypertensive regimen of labetalol 400mg TID and procardia XL 90mg daily.    Close monitoring of her blood pressures and titrate above regimen as needed.  Continue routine PO care    CHAD Gonzalez (OB hospitalist)
Pt feels well overall on POD 1 after repeat c/s at 35 wks for PEC w SF.  She c/o slight blurry vision today but denies any headache or epigastric discomfort  Currently on labetalol 300mg TID and procardia 30 mg daily.  BPs range: 113-152/77-83).    Pt is adequately controlled and bleeding is moderate  Will continue close monitoring of her bps  Continue Mg sulfate for 24 hours PP  Above done using [place] .    CHAD Gonzalez (OB hospitalist)
a 35y  now POD#5 s/p repeat  section at 35 EGA and bilateral salpingectomy, complicated by siPECwSF s/p Mg. Pt reports feeling well. Pain controlled. Elvia diet, + amb, + void, + flatus, mild lochia Denies any HA or vision changes  T(F): 97.5 (2025 00:00), Max: 98.6 (2025 12:09)  HR: 69 (2025 00:00) (69 - 84)  BP: 128/82 (2025 00:00) (128/82 - 156/92)  INcision C/D/I with steri strios  POD#5 s/P r c/s and BS with PEC w SF on mag  - cHTN with superimposed PEC - s/p mag, elevated BPs requiring med adjustment      currently on Labetolol 600mg TID and Procardia 90 - will monitor BP today and reassess  - pain control  - encourage amb  - diet as elvia  - will monitor  BP and reassess if possible d/c home as patient desires d/c JAYSONP    Janet Doyle MD
35y  now POD#3 s/p rCS +BTL @ 35w chronic HTN with superimposed PEC with SF.  - currently with minimal bleeding/lochia only, no symptoms of anemia, post partum Hgb 9.2, consistent with acute blood loss anemia, plan for PO supplementation  - PIH: s/p MgSO4, now on Labetalol 400 TID + Procardia 60 XL QD, BP still elevated, BP monitoring ongoing, will adjust meds as needed   - male infant in NICU    - vital signs, lab results, and physical exam reassuring   - DVT PPX: ambulation/Lovenox  - anticipated discharge: continue BP monitoring and medication adjustments, re-eval in afternoon
Pt feels better today on POD 2.  No longer c/o visual changes.  Also denies any headache or epigastric pain.  s/p Mg sulfate for seizure prophylaxis.  Labetalol was uptitrated from 300mg to 400mg TID and procardia 60mg XL daily  Most recent bp [id number]/72.  Pain is adequately controlled and bleeding is light to moderate  Will continue close monitoring of her bps  routine PO care    CHAD Gonzalez (OB hospitalist)
(late entry)  Pt seen and evaluated and agree with above assessment and plan.  Pt is doing well on POD 6 after repeat c/s and BS at 35+wks secondary to PEC wSF.  Pt is s/p Mg sulfate for seizure prophylaxis.  Pt has had her bp regimen titrated to optimize her bp management.  She denies any headache, visual changes or epigastric discomfort.   Her pain is well controlled and bleeding is light to moderate.  Current bp regimen:  labetalol 600mg TID and procardia 90 mg daily   Bps since new regimen 122-132/75-83.  Discussed recommendation for in house cardiology consult prior to discharge to optimize her bp control.  Pt agreeable with plan.  Will call cardiology   If bps adequately controlled can d/c home later today.  Advised pt she must f/u with her primary OB Dr Choe in the next 1-3 days for bp check  Pt also to monitor her bps at home.  Above done using [place] .    CHAD Gonzalez (OB hospitalist)

## 2025-04-29 NOTE — CONSULT NOTE ADULT - NS ATTEND AMEND GEN_ALL_CORE FT
35y  at 35w GA presents to L&D for elevated BPs in the office. now POD#6 s/p repeat  section at 35 EGA and bilateral salpingectomy, complicated by siPECwSF s/p Mg.       A cardia consult was placed due to continue elevated bp.   Patient states she had elevate bp as well, and stayed on bp meds between pregnancies.  Her home medications included Labetalol 100mg po bid and procardia 60 daily.     Denies headache dizziness, chest pain, sob, pnd, orthopnea hemoptysis, n/v/d/c/abd pain, fever, chills, syncope, near syncope,  urinary discomfort cramps or any other pain.        Chronic Hypertension      post pregnancy chronic hypertension  will take 4-12 weeks on average to adjust post delivery to home anti-hypertensive regimen  would adjust medications to the following  Labetolol 400mg TID and Procardia 60mg Daily for 1 week post delivery then likely will need to de-escalate.    f/u in 1 week with Cardio obstetrics as outpatient.    as body adjusts post pregnancy state, adjust regimen  patient in agreement and understands    please call with questions, we will follow peripherally.

## 2025-04-29 NOTE — PROGRESS NOTE ADULT - SUBJECTIVE AND OBJECTIVE BOX
ASHTYN HERNANDEZ is a 35y  now POD#6 s/p repeat  section at 35 EGA and bilateral salpingectomy, complicated by siPECwSF s/p Mg.    Subjective:   Procardia increased to 120 QD yesterday AM  Patient has no complaints  Pain is well controlled.  +flatus, +BM, + voiding.  Ambulating and tolerating PO.  Appropriate lochia.  Denies fever, chills, nausea, and vomiting.  She denies lightheadedness, dizziness, HA, blurry vision, palpitations, chest pain and SOB.     Objective:    Vital Signs Last 24 Hrs  T(C): 36.7 (2025 00:24), Max: 36.9 (2025 19:47)  T(F): 98 (2025 00:24), Max: 98.4 (2025 19:47)  HR: 71 (2025 00:24) (68 - 77)  BP: 122/81 (2025 00:24) (122/78 - 158/82)  RR: 18 (2025 00:24) (17 - 18)  SpO2: 99% (2025 00:24) (98% - 99%)    Parameters below as of 2025 00:24  Patient On (Oxygen Delivery Method): room air        Physical exam:  General: AOx3, NAD.  Breast: b/l engorgement, no erythema, fluctuance or warmth  Abdomen: Soft, appropriately tender to palpitation, fundus firm.  Incision: Clean, dry and intact incision, steri strips in place  : Voiding  Vaginal: expectant lochia  Ext: No DVT signs, warm extremities.

## 2025-04-30 ENCOUNTER — APPOINTMENT (OUTPATIENT)
Dept: ANTEPARTUM | Facility: CLINIC | Age: 35
End: 2025-04-30

## 2025-04-30 LAB — SURGICAL PATHOLOGY STUDY: SIGNIFICANT CHANGE UP

## 2025-05-07 ENCOUNTER — APPOINTMENT (OUTPATIENT)
Dept: ANTEPARTUM | Facility: CLINIC | Age: 35
End: 2025-05-07

## 2025-05-14 ENCOUNTER — APPOINTMENT (OUTPATIENT)
Dept: ANTEPARTUM | Facility: CLINIC | Age: 35
End: 2025-05-14

## 2025-05-21 ENCOUNTER — APPOINTMENT (OUTPATIENT)
Dept: ANTEPARTUM | Facility: CLINIC | Age: 35
End: 2025-05-21

## 2025-05-28 ENCOUNTER — APPOINTMENT (OUTPATIENT)
Dept: ANTEPARTUM | Facility: CLINIC | Age: 35
End: 2025-05-28
